# Patient Record
Sex: FEMALE | Employment: OTHER | URBAN - METROPOLITAN AREA
[De-identification: names, ages, dates, MRNs, and addresses within clinical notes are randomized per-mention and may not be internally consistent; named-entity substitution may affect disease eponyms.]

---

## 2017-01-25 ENCOUNTER — APPOINTMENT (OUTPATIENT)
Dept: CT IMAGING | Age: 32
End: 2017-01-25
Attending: PHYSICIAN ASSISTANT
Payer: COMMERCIAL

## 2017-01-25 ENCOUNTER — HOSPITAL ENCOUNTER (EMERGENCY)
Age: 32
Discharge: HOME | End: 2017-01-25
Attending: EMERGENCY MEDICINE
Payer: COMMERCIAL

## 2017-01-25 VITALS
WEIGHT: 180 LBS | RESPIRATION RATE: 16 BRPM | SYSTOLIC BLOOD PRESSURE: 125 MMHG | DIASTOLIC BLOOD PRESSURE: 65 MMHG | HEIGHT: 62 IN | BODY MASS INDEX: 33.13 KG/M2 | HEART RATE: 74 BPM | OXYGEN SATURATION: 100 %

## 2017-01-25 DIAGNOSIS — R10.84 ABDOMINAL PAIN, GENERALIZED: Primary | ICD-10-CM

## 2017-01-25 LAB
ALBUMIN SERPL BCP-MCNC: 4.2 G/DL (ref 3.5–5)
ALBUMIN/GLOB SERPL: 1.1 {RATIO} (ref 1.1–2.2)
ALP SERPL-CCNC: 64 U/L (ref 45–117)
ALT SERPL-CCNC: 24 U/L (ref 12–78)
ANION GAP BLD CALC-SCNC: 9 MMOL/L (ref 5–15)
APPEARANCE UR: CLEAR
AST SERPL W P-5'-P-CCNC: 13 U/L (ref 15–37)
ATRIAL RATE: 73 BPM
BACTERIA URNS QL MICRO: NEGATIVE /HPF
BASOPHILS # BLD AUTO: 0 K/UL (ref 0–0.1)
BASOPHILS # BLD: 0 % (ref 0–1)
BILIRUB SERPL-MCNC: 0.3 MG/DL (ref 0.2–1)
BILIRUB UR QL: NEGATIVE
BUN SERPL-MCNC: 13 MG/DL (ref 6–20)
BUN/CREAT SERPL: 16 (ref 12–20)
CALCIUM SERPL-MCNC: 8.6 MG/DL (ref 8.5–10.1)
CALCULATED P AXIS, ECG09: 7 DEGREES
CALCULATED R AXIS, ECG10: 8 DEGREES
CALCULATED T AXIS, ECG11: 4 DEGREES
CHLORIDE SERPL-SCNC: 103 MMOL/L (ref 97–108)
CO2 SERPL-SCNC: 25 MMOL/L (ref 21–32)
COLOR UR: ABNORMAL
CREAT SERPL-MCNC: 0.82 MG/DL (ref 0.55–1.02)
DIAGNOSIS, 93000: NORMAL
EOSINOPHIL # BLD: 0 K/UL (ref 0–0.4)
EOSINOPHIL NFR BLD: 0 % (ref 0–7)
EPITH CASTS URNS QL MICRO: ABNORMAL /LPF
ERYTHROCYTE [DISTWIDTH] IN BLOOD BY AUTOMATED COUNT: 12 % (ref 11.5–14.5)
GLOBULIN SER CALC-MCNC: 3.7 G/DL (ref 2–4)
GLUCOSE SERPL-MCNC: 115 MG/DL (ref 65–100)
GLUCOSE UR STRIP.AUTO-MCNC: NEGATIVE MG/DL
HCG UR QL: NEGATIVE
HCT VFR BLD AUTO: 38.4 % (ref 35–47)
HGB BLD-MCNC: 12.3 G/DL (ref 11.5–16)
HGB UR QL STRIP: ABNORMAL
HYALINE CASTS URNS QL MICRO: ABNORMAL /LPF (ref 0–5)
KETONES UR QL STRIP.AUTO: NEGATIVE MG/DL
LEUKOCYTE ESTERASE UR QL STRIP.AUTO: NEGATIVE
LIPASE SERPL-CCNC: 124 U/L (ref 73–393)
LYMPHOCYTES # BLD AUTO: 14 % (ref 12–49)
LYMPHOCYTES # BLD: 1.5 K/UL (ref 0.8–3.5)
MCH RBC QN AUTO: 28 PG (ref 26–34)
MCHC RBC AUTO-ENTMCNC: 32 G/DL (ref 30–36.5)
MCV RBC AUTO: 87.3 FL (ref 80–99)
MONOCYTES # BLD: 0.3 K/UL (ref 0–1)
MONOCYTES NFR BLD AUTO: 3 % (ref 5–13)
NEUTS SEG # BLD: 8.6 K/UL (ref 1.8–8)
NEUTS SEG NFR BLD AUTO: 83 % (ref 32–75)
NITRITE UR QL STRIP.AUTO: NEGATIVE
P-R INTERVAL, ECG05: 144 MS
PH UR STRIP: 6 [PH] (ref 5–8)
PLATELET # BLD AUTO: 273 K/UL (ref 150–400)
POTASSIUM SERPL-SCNC: 4.2 MMOL/L (ref 3.5–5.1)
PROT SERPL-MCNC: 7.9 G/DL (ref 6.4–8.2)
PROT UR STRIP-MCNC: NEGATIVE MG/DL
Q-T INTERVAL, ECG07: 434 MS
QRS DURATION, ECG06: 86 MS
QTC CALCULATION (BEZET), ECG08: 478 MS
RBC # BLD AUTO: 4.4 M/UL (ref 3.8–5.2)
RBC #/AREA URNS HPF: ABNORMAL /HPF (ref 0–5)
SODIUM SERPL-SCNC: 137 MMOL/L (ref 136–145)
SP GR UR REFRACTOMETRY: 1.03 (ref 1–1.03)
TROPONIN I SERPL-MCNC: <0.04 NG/ML
UA: UC IF INDICATED,UAUC: ABNORMAL
UROBILINOGEN UR QL STRIP.AUTO: 0.2 EU/DL (ref 0.2–1)
VENTRICULAR RATE, ECG03: 73 BPM
WBC # BLD AUTO: 10.4 K/UL (ref 3.6–11)
WBC URNS QL MICRO: ABNORMAL /HPF (ref 0–4)

## 2017-01-25 PROCEDURE — 81001 URINALYSIS AUTO W/SCOPE: CPT | Performed by: EMERGENCY MEDICINE

## 2017-01-25 PROCEDURE — 85025 COMPLETE CBC W/AUTO DIFF WBC: CPT | Performed by: PHYSICIAN ASSISTANT

## 2017-01-25 PROCEDURE — 80053 COMPREHEN METABOLIC PANEL: CPT | Performed by: PHYSICIAN ASSISTANT

## 2017-01-25 PROCEDURE — 96361 HYDRATE IV INFUSION ADD-ON: CPT

## 2017-01-25 PROCEDURE — 99285 EMERGENCY DEPT VISIT HI MDM: CPT

## 2017-01-25 PROCEDURE — 36415 COLL VENOUS BLD VENIPUNCTURE: CPT | Performed by: PHYSICIAN ASSISTANT

## 2017-01-25 PROCEDURE — 84484 ASSAY OF TROPONIN QUANT: CPT | Performed by: PHYSICIAN ASSISTANT

## 2017-01-25 PROCEDURE — 81025 URINE PREGNANCY TEST: CPT

## 2017-01-25 PROCEDURE — 83690 ASSAY OF LIPASE: CPT | Performed by: PHYSICIAN ASSISTANT

## 2017-01-25 PROCEDURE — 96374 THER/PROPH/DIAG INJ IV PUSH: CPT

## 2017-01-25 PROCEDURE — 74011636320 HC RX REV CODE- 636/320: Performed by: RADIOLOGY

## 2017-01-25 PROCEDURE — 74177 CT ABD & PELVIS W/CONTRAST: CPT

## 2017-01-25 PROCEDURE — 93005 ELECTROCARDIOGRAM TRACING: CPT

## 2017-01-25 PROCEDURE — 74011250636 HC RX REV CODE- 250/636: Performed by: PHYSICIAN ASSISTANT

## 2017-01-25 RX ORDER — ONDANSETRON 4 MG/1
4 TABLET, ORALLY DISINTEGRATING ORAL
Qty: 10 TAB | Refills: 0 | Status: SHIPPED | OUTPATIENT
Start: 2017-01-25 | End: 2018-06-12

## 2017-01-25 RX ORDER — KETOROLAC TROMETHAMINE 30 MG/ML
30 INJECTION, SOLUTION INTRAMUSCULAR; INTRAVENOUS
Status: COMPLETED | OUTPATIENT
Start: 2017-01-25 | End: 2017-01-25

## 2017-01-25 RX ORDER — FAMOTIDINE 20 MG/1
20 TABLET, FILM COATED ORAL 2 TIMES DAILY
Qty: 20 TAB | Refills: 0 | Status: SHIPPED | OUTPATIENT
Start: 2017-01-25 | End: 2017-02-04

## 2017-01-25 RX ADMIN — SODIUM CHLORIDE 1000 ML: 900 INJECTION, SOLUTION INTRAVENOUS at 08:43

## 2017-01-25 RX ADMIN — KETOROLAC TROMETHAMINE 30 MG: 30 INJECTION, SOLUTION INTRAMUSCULAR at 09:06

## 2017-01-25 RX ADMIN — IOPAMIDOL 95 ML: 755 INJECTION, SOLUTION INTRAVENOUS at 09:48

## 2017-01-25 NOTE — ED NOTES
Pt discharged by provider. Pt ambulatory off the unit with a steady gait with her spouse and in NAD. Pt declined using a w/c.

## 2017-01-25 NOTE — ED PROVIDER NOTES
HPI Comments: 27 y.o. female with past medical history significant for PCOS presents with complaints of abdominal pain, nausea, and diarrhea since 0330 this morning. The pt states that nothing makes the pain better or worse. The pt rates the pain as a 6/10 in severity. There is no radiation of the pain. The pt describes the pain as cramping sensation. The pt denies taking anything at home for the abdominal pain. There are no other acute medical complaints at this time. Denies fever, chills, HA, dizziness, light headedness, dyspnea, chest pain, melena, hematochezia, loss of bowel/bladder functioning, saddle anesthesia, urinary symptoms or any other acute medical conditions. PCP: CAMILA Reagan PA-C      Patient is a 27 y.o. female presenting with abdominal pain. Abdominal Pain    Associated symptoms include diarrhea and nausea. Pertinent negatives include no fever, no vomiting, no constipation, no dysuria, no hematuria, no arthralgias, no myalgias, no chest pain and no back pain. Past Medical History:   Diagnosis Date    Heart abnormality      heart murmur, pt states. does not see cardiologist for this.  Heart murmur     Polycystic disease, ovaries     Polycystic ovary syndrome        Past Surgical History:   Procedure Laterality Date    Hx carpal tunnel release           Family History:   Problem Relation Age of Onset    Hypertension Mother     Diabetes Mother     Stroke Father        Social History     Social History    Marital status:      Spouse name: N/A    Number of children: N/A    Years of education: N/A     Occupational History    Not on file.      Social History Main Topics    Smoking status: Never Smoker    Smokeless tobacco: Not on file    Alcohol use No    Drug use: No    Sexual activity: Yes     Partners: Male     Other Topics Concern    Not on file     Social History Narrative         ALLERGIES: Codeine and Percocet [oxycodone-acetaminophen]    Review of Systems   Constitutional: Negative for activity change, appetite change, diaphoresis and fever. HENT: Negative for ear discharge, ear pain, facial swelling, rhinorrhea, sore throat, tinnitus, trouble swallowing and voice change. Eyes: Negative for photophobia, pain, discharge, redness and visual disturbance. Respiratory: Negative for cough, chest tightness, shortness of breath, wheezing and stridor. Cardiovascular: Negative for chest pain and palpitations. Gastrointestinal: Positive for abdominal pain, diarrhea and nausea. Negative for constipation and vomiting. Endocrine: Negative for polydipsia and polyuria. Genitourinary: Negative for dysuria, flank pain and hematuria. Musculoskeletal: Negative for arthralgias, back pain and myalgias. Skin: Negative for color change and rash. Neurological: Negative for dizziness, syncope, speech difficulty, light-headedness and numbness. Psychiatric/Behavioral: Negative for behavioral problems. Vitals:    01/25/17 0812   BP: 134/72   Pulse: 67   Resp: 16   SpO2: 100%   Weight: 81.6 kg (180 lb)   Height: 5' 2\" (1.575 m)            Physical Exam   Constitutional: She is oriented to person, place, and time. She appears well-developed and well-nourished. HENT:   Head: Normocephalic and atraumatic. Eyes: Conjunctivae are normal. Pupils are equal, round, and reactive to light. Right eye exhibits no discharge. Left eye exhibits no discharge. Neck: Normal range of motion. Neck supple. No thyromegaly present. Cardiovascular: Normal rate, regular rhythm and normal heart sounds. Exam reveals no gallop and no friction rub. No murmur heard. Pulmonary/Chest: Effort normal and breath sounds normal. No respiratory distress. She has no wheezes. Abdominal: Soft. Bowel sounds are normal. She exhibits no distension. There is no tenderness. There is no rebound and no guarding.    No abdominal bruits or pulsatile masses. No abdominal hernias. Negative Lous sign. Pt abdomen non tender to both light and deep palpation. No mindy-umbilical or flank ecchymosis. Musculoskeletal: Normal range of motion. Neurological: She is alert and oriented to person, place, and time. Skin: Skin is warm. Psychiatric: She has a normal mood and affect. MDM  Number of Diagnoses or Management Options  Abdominal pain, generalized:   Diagnosis management comments: Pt presents with abdominal pain nausea and diarrhea. Labs and imaging studies all unremarkable. Sx likely result of GI virus. Will rx pepcid and zofran and advise follow up with family doctor. Reviewed treatment plan with attending and they agree. Melquiades Solomon      ED Course       Procedures      ED EKG interpretation:  Rhythm: normal sinus rhythm; and regular . Rate (approx.): 73; Axis: normal; P wave: normal; QRS interval: normal ; ST/T wave: normal; This EKG was interpreted by Gumaro Johnson PA-C,ED Provider.

## 2017-01-25 NOTE — DISCHARGE INSTRUCTIONS
Abdominal Pain: Care Instructions  Your Care Instructions    Abdominal pain has many possible causes. Some aren't serious and get better on their own in a few days. Others need more testing and treatment. If your pain continues or gets worse, you need to be rechecked and may need more tests to find out what is wrong. You may need surgery to correct the problem. Don't ignore new symptoms, such as fever, nausea and vomiting, urination problems, pain that gets worse, and dizziness. These may be signs of a more serious problem. Your doctor may have recommended a follow-up visit in the next 8 to 12 hours. If you are not getting better, you may need more tests or treatment. The doctor has checked you carefully, but problems can develop later. If you notice any problems or new symptoms, get medical treatment right away. Follow-up care is a key part of your treatment and safety. Be sure to make and go to all appointments, and call your doctor if you are having problems. It's also a good idea to know your test results and keep a list of the medicines you take. How can you care for yourself at home? · Rest until you feel better. · To prevent dehydration, drink plenty of fluids, enough so that your urine is light yellow or clear like water. Choose water and other caffeine-free clear liquids until you feel better. If you have kidney, heart, or liver disease and have to limit fluids, talk with your doctor before you increase the amount of fluids you drink. · If your stomach is upset, eat mild foods, such as rice, dry toast or crackers, bananas, and applesauce. Try eating several small meals instead of two or three large ones. · Wait until 48 hours after all symptoms have gone away before you have spicy foods, alcohol, and drinks that contain caffeine. · Do not eat foods that are high in fat. · Avoid anti-inflammatory medicines such as aspirin, ibuprofen (Advil, Motrin), and naproxen (Aleve).  These can cause stomach upset. Talk to your doctor if you take daily aspirin for another health problem. When should you call for help? Call 911 anytime you think you may need emergency care. For example, call if:  · You passed out (lost consciousness). · You pass maroon or very bloody stools. · You vomit blood or what looks like coffee grounds. · You have new, severe belly pain. Call your doctor now or seek immediate medical care if:  · Your pain gets worse, especially if it becomes focused in one area of your belly. · You have a new or higher fever. · Your stools are black and look like tar, or they have streaks of blood. · You have unexpected vaginal bleeding. · You have symptoms of a urinary tract infection. These may include:  ¨ Pain when you urinate. ¨ Urinating more often than usual.  ¨ Blood in your urine. · You are dizzy or lightheaded, or you feel like you may faint. Watch closely for changes in your health, and be sure to contact your doctor if:  · You are not getting better after 1 day (24 hours). Where can you learn more? Go to http://gustavo-chema.info/. Enter O339 in the search box to learn more about \"Abdominal Pain: Care Instructions. \"  Current as of: May 27, 2016  Content Version: 11.1  © 1457-6763 [email or web address]. Care instructions adapted under license by [employer] (which disclaims liability or warranty for this information). If you have questions about a medical condition or this instruction, always ask your healthcare professional. [place] [address] any warranty or liability for your use of this information. We hope that we have addressed all of your medical concerns. The examination and treatment you received in the Emergency Department were for an emergent problem and were not intended as complete care. It is important that you follow up with your healthcare provider(s) for ongoing care.  If your symptoms worsen or do not improve as expected, and you are unable to reach your usual health care provider(s), you should return to the Emergency Department. Today's healthcare is undergoing tremendous change, and patient satisfaction surveys are one of the many tools to assess the quality of medical care. You may receive a survey from the [employer] regarding your experience in the Emergency Department. I hope that your experience has been completely positive, particularly the medical care that I provided. As such, please participate in the survey; anything less than excellent does not meet my expectations or intentions. [address] [place] [address] and [employer] participate in nationally recognized quality of care measures. If your blood pressure is greater than 120/80, as reported below, we urge that you seek medical care to address the potential of high blood pressure, commonly known as hypertension. Hypertension can be hereditary or can be caused by certain medical conditions, pain, stress, or \"white coat syndrome. \"       Please make an appointment with your health care provider(s) for follow up of your Emergency Department visit. VITALS:   Patient Vitals for the past 8 hrs:   Temp Pulse Resp BP SpO2   01/25/17 1030 - - - 130/78 100 %   01/25/17 0955 - - - 128/64 100 %   01/25/17 0932 - - - 123/67 100 %   01/25/17 0911 - - - 130/70 100 %   01/25/17 0812 - 67 16 134/72 100 %          Thank you for allowing us to provide you with medical care today. We realize that you have many choices for your emergency care needs. Please choose us in the future for any continued health care needs. Shyam Acevedo, [facility].   Office: [phone]            Recent Results (from the past 24 hour(s))   EKG, 12 LEAD, INITIAL    Collection Time: 01/25/17  8:30 AM   Result Value Ref Range    Ventricular Rate 73 BPM    Atrial Rate 73 BPM P-R Interval 144 ms    QRS Duration 86 ms    Q-T Interval 434 ms    QTC Calculation (Bezet) 478 ms    Calculated P Axis 7 degrees    Calculated R Axis 8 degrees    Calculated T Axis 4 degrees    Diagnosis       Normal sinus rhythm  Normal ECG  No previous ECGs available  Confirmed by Tarik Matthew MD ([id number]) on 1/25/2017 10:24:35 AM     CBC WITH AUTOMATED DIFF    Collection Time: 01/25/17  8:42 AM   Result Value Ref Range    WBC 10.4 3.6 - 11.0 K/uL    RBC 4.40 3.80 - 5.20 M/uL    HGB 12.3 11.5 - 16.0 g/dL    HCT 38.4 35.0 - 47.0 %    MCV 87.3 80.0 - 99.0 FL    MCH 28.0 26.0 - 34.0 PG    MCHC 32.0 30.0 - 36.5 g/dL    RDW 12.0 11.5 - 14.5 %    PLATELET 657 017 - 394 K/uL    NEUTROPHILS 83 (H) 32 - 75 %    LYMPHOCYTES 14 12 - 49 %    MONOCYTES 3 (L) 5 - 13 %    EOSINOPHILS 0 0 - 7 %    BASOPHILS 0 0 - 1 %    ABS. NEUTROPHILS 8.6 (H) 1.8 - 8.0 K/UL    ABS. LYMPHOCYTES 1.5 0.8 - 3.5 K/UL    ABS. MONOCYTES 0.3 0.0 - 1.0 K/UL    ABS. EOSINOPHILS 0.0 0.0 - 0.4 K/UL    ABS. BASOPHILS 0.0 0.0 - 0.1 K/UL   METABOLIC PANEL, COMPREHENSIVE    Collection Time: 01/25/17  8:42 AM   Result Value Ref Range    Sodium 137 136 - 145 mmol/L    Potassium 4.2 3.5 - 5.1 mmol/L    Chloride 103 97 - 108 mmol/L    CO2 25 21 - 32 mmol/L    Anion gap 9 5 - 15 mmol/L    Glucose 115 (H) 65 - 100 mg/dL    BUN 13 6 - 20 MG/DL    Creatinine 0.82 0.55 - 1.02 MG/DL    BUN/Creatinine ratio 16 12 - 20      GFR est AA >60 >60 ml/min/1.73m2    GFR est non-AA >60 >60 ml/min/1.73m2    Calcium 8.6 8.5 - 10.1 MG/DL    Bilirubin, total 0.3 0.2 - 1.0 MG/DL    ALT 24 12 - 78 U/L    AST 13 (L) 15 - 37 U/L    Alk.  phosphatase 64 45 - 117 U/L    Protein, total 7.9 6.4 - 8.2 g/dL    Albumin 4.2 3.5 - 5.0 g/dL    Globulin 3.7 2.0 - 4.0 g/dL    A-G Ratio 1.1 1.1 - 2.2     LIPASE    Collection Time: 01/25/17  8:42 AM   Result Value Ref Range    Lipase 124 73 - 393 U/L   TROPONIN I    Collection Time: 01/25/17  8:42 AM   Result Value Ref Range    Troponin-I, Qt. <0.04 <0.05 ng/mL   HCG URINE, QL. - POC    Collection Time: 01/25/17  9:05 AM   Result Value Ref Range    Pregnancy test,urine (POC) NEGATIVE  NEG     URINALYSIS W/ REFLEX CULTURE    Collection Time: 01/25/17  9:08 AM   Result Value Ref Range    Color YELLOW/STRAW      Appearance CLEAR CLEAR      Specific gravity 1.027 1.003 - 1.030      pH (UA) 6.0 5.0 - 8.0      Protein NEGATIVE  NEG mg/dL    Glucose NEGATIVE  NEG mg/dL    Ketone NEGATIVE  NEG mg/dL    Bilirubin NEGATIVE  NEG      Blood LARGE (A) NEG      Urobilinogen 0.2 0.2 - 1.0 EU/dL    Nitrites NEGATIVE  NEG      Leukocyte Esterase NEGATIVE  NEG      WBC 0-4 0 - 4 /hpf    RBC  0 - 5 /hpf    Epithelial cells FEW FEW /lpf    Bacteria NEGATIVE  NEG /hpf    UA:UC IF INDICATED CULTURE NOT INDICATED BY UA RESULT CNI      Hyaline Cast 0-2 0 - 5 /lpf       Ct Abd Pelv W Cont    Result Date: 1/25/2017  INDICATION: Acute mid upper abdominal pain, nausea, diarrhea COMPARISON: None TECHNIQUE: Following the uneventful intravenous administration of 100 cc Isovue-370, thin axial images were obtained through the abdomen and pelvis. Coronal and sagittal reconstructions were generated. Oral contrast was not administered. CT dose reduction was achieved through use of a standardized protocol tailored for this examination and automatic exposure control for dose modulation. FINDINGS: LUNG BASES: Clear. INCIDENTALLY IMAGED HEART AND MEDIASTINUM: Unremarkable. LIVER: No mass or biliary dilatation. GALLBLADDER: Unremarkable. SPLEEN: No mass. PANCREAS: No mass or ductal dilatation. ADRENALS: Unremarkable. KIDNEYS: Bilateral nonobstructing renal stones are noted, the largest of which measures 5 mm. There is no ureteral stone or hydronephrosis. STOMACH: Unremarkable. SMALL BOWEL: No dilatation or wall thickening. COLON: No dilatation or wall thickening. APPENDIX: Unremarkable. PERITONEUM: No ascites or pneumoperitoneum. RETROPERITONEUM: No lymphadenopathy or aortic aneurysm. REPRODUCTIVE ORGANS: Intrauterine device projects in satisfactory position. There is no pelvic mass or lymphadenopathy. URINARY BLADDER: No mass or calculus. BONES: No destructive bone lesion. ADDITIONAL COMMENTS: N/A     IMPRESSION: Bilateral nonobstructing renal stones. No acute abnormality.

## 2018-06-11 ENCOUNTER — APPOINTMENT (OUTPATIENT)
Dept: GENERAL RADIOLOGY | Age: 33
End: 2018-06-11
Attending: EMERGENCY MEDICINE
Payer: COMMERCIAL

## 2018-06-11 ENCOUNTER — APPOINTMENT (OUTPATIENT)
Dept: GENERAL RADIOLOGY | Age: 33
End: 2018-06-11
Attending: PHYSICIAN ASSISTANT
Payer: COMMERCIAL

## 2018-06-11 ENCOUNTER — APPOINTMENT (OUTPATIENT)
Dept: CT IMAGING | Age: 33
End: 2018-06-11
Attending: PHYSICIAN ASSISTANT
Payer: COMMERCIAL

## 2018-06-11 ENCOUNTER — HOSPITAL ENCOUNTER (EMERGENCY)
Age: 33
Discharge: HOME | End: 2018-06-12
Attending: EMERGENCY MEDICINE | Admitting: EMERGENCY MEDICINE
Payer: COMMERCIAL

## 2018-06-11 ENCOUNTER — APPOINTMENT (OUTPATIENT)
Dept: GENERAL RADIOLOGY | Age: 33
End: 2018-06-11
Attending: ORTHOPAEDIC SURGERY
Payer: COMMERCIAL

## 2018-06-11 VITALS
TEMPERATURE: 98.2 F | WEIGHT: 182 LBS | HEIGHT: 63 IN | DIASTOLIC BLOOD PRESSURE: 65 MMHG | RESPIRATION RATE: 18 BRPM | BODY MASS INDEX: 32.25 KG/M2 | HEART RATE: 67 BPM | OXYGEN SATURATION: 94 % | SYSTOLIC BLOOD PRESSURE: 122 MMHG

## 2018-06-11 DIAGNOSIS — S82.401A CLOSED FRACTURE OF RIGHT TIBIA AND FIBULA, INITIAL ENCOUNTER: Primary | ICD-10-CM

## 2018-06-11 DIAGNOSIS — S82.201A CLOSED FRACTURE OF RIGHT TIBIA AND FIBULA, INITIAL ENCOUNTER: Primary | ICD-10-CM

## 2018-06-11 PROCEDURE — 74011250636 HC RX REV CODE- 250/636: Performed by: EMERGENCY MEDICINE

## 2018-06-11 PROCEDURE — 99284 EMERGENCY DEPT VISIT MOD MDM: CPT

## 2018-06-11 PROCEDURE — 73610 X-RAY EXAM OF ANKLE: CPT

## 2018-06-11 PROCEDURE — 74011250636 HC RX REV CODE- 250/636: Performed by: PHYSICIAN ASSISTANT

## 2018-06-11 PROCEDURE — 73700 CT LOWER EXTREMITY W/O DYE: CPT

## 2018-06-11 PROCEDURE — 96375 TX/PRO/DX INJ NEW DRUG ADDON: CPT

## 2018-06-11 PROCEDURE — 74011000250 HC RX REV CODE- 250: Performed by: EMERGENCY MEDICINE

## 2018-06-11 PROCEDURE — 96376 TX/PRO/DX INJ SAME DRUG ADON: CPT

## 2018-06-11 PROCEDURE — 75810000303 HC CLSD TRMT  FRACTURE/DISLOCATION W/  ANES

## 2018-06-11 PROCEDURE — 74011250637 HC RX REV CODE- 250/637: Performed by: EMERGENCY MEDICINE

## 2018-06-11 PROCEDURE — 73600 X-RAY EXAM OF ANKLE: CPT

## 2018-06-11 PROCEDURE — 96374 THER/PROPH/DIAG INJ IV PUSH: CPT

## 2018-06-11 RX ORDER — HYDROMORPHONE HYDROCHLORIDE 1 MG/ML
0.5 INJECTION, SOLUTION INTRAMUSCULAR; INTRAVENOUS; SUBCUTANEOUS
Status: COMPLETED | OUTPATIENT
Start: 2018-06-11 | End: 2018-06-12

## 2018-06-11 RX ORDER — MORPHINE SULFATE 4 MG/ML
4 INJECTION INTRAVENOUS
Status: COMPLETED | OUTPATIENT
Start: 2018-06-11 | End: 2018-06-11

## 2018-06-11 RX ORDER — HYDROMORPHONE HYDROCHLORIDE 2 MG/1
2 TABLET ORAL
Qty: 10 TAB | Refills: 0 | Status: SHIPPED | OUTPATIENT
Start: 2018-06-11 | End: 2020-09-22

## 2018-06-11 RX ORDER — HYDROCODONE BITARTRATE AND ACETAMINOPHEN 7.5; 325 MG/1; MG/1
1 TABLET ORAL
Status: COMPLETED | OUTPATIENT
Start: 2018-06-11 | End: 2018-06-11

## 2018-06-11 RX ORDER — HYDROCODONE BITARTRATE AND ACETAMINOPHEN 7.5; 325 MG/1; MG/1
1 TABLET ORAL
Status: DISCONTINUED | OUTPATIENT
Start: 2018-06-11 | End: 2018-06-11

## 2018-06-11 RX ORDER — HYDROMORPHONE HYDROCHLORIDE 1 MG/ML
1 INJECTION, SOLUTION INTRAMUSCULAR; INTRAVENOUS; SUBCUTANEOUS
Status: COMPLETED | OUTPATIENT
Start: 2018-06-11 | End: 2018-06-11

## 2018-06-11 RX ORDER — BUPIVACAINE HYDROCHLORIDE 5 MG/ML
5 INJECTION, SOLUTION EPIDURAL; INTRACAUDAL
Status: COMPLETED | OUTPATIENT
Start: 2018-06-11 | End: 2018-06-11

## 2018-06-11 RX ORDER — LIDOCAINE HYDROCHLORIDE 20 MG/ML
5 INJECTION, SOLUTION EPIDURAL; INFILTRATION; INTRACAUDAL; PERINEURAL ONCE
Status: COMPLETED | OUTPATIENT
Start: 2018-06-11 | End: 2018-06-11

## 2018-06-11 RX ORDER — HYDROMORPHONE HYDROCHLORIDE 1 MG/ML
0.5 INJECTION, SOLUTION INTRAMUSCULAR; INTRAVENOUS; SUBCUTANEOUS
Status: COMPLETED | OUTPATIENT
Start: 2018-06-11 | End: 2018-06-11

## 2018-06-11 RX ORDER — ONDANSETRON 2 MG/ML
4 INJECTION INTRAMUSCULAR; INTRAVENOUS
Status: COMPLETED | OUTPATIENT
Start: 2018-06-11 | End: 2018-06-11

## 2018-06-11 RX ADMIN — LIDOCAINE HYDROCHLORIDE 100 MG: 20 INJECTION, SOLUTION EPIDURAL; INFILTRATION; INTRACAUDAL; PERINEURAL at 21:10

## 2018-06-11 RX ADMIN — BUPIVACAINE HYDROCHLORIDE 25 MG: 5 INJECTION, SOLUTION EPIDURAL; INTRACAUDAL at 21:10

## 2018-06-11 RX ADMIN — HYDROMORPHONE HYDROCHLORIDE 0.5 MG: 1 INJECTION, SOLUTION INTRAMUSCULAR; INTRAVENOUS; SUBCUTANEOUS at 21:53

## 2018-06-11 RX ADMIN — MORPHINE SULFATE 4 MG: 4 INJECTION INTRAVENOUS at 19:04

## 2018-06-11 RX ADMIN — HYDROCODONE BITARTRATE AND ACETAMINOPHEN 1 TABLET: 7.5; 325 TABLET ORAL at 23:03

## 2018-06-11 RX ADMIN — ONDANSETRON HYDROCHLORIDE 4 MG: 2 INJECTION INTRAMUSCULAR; INTRAVENOUS at 19:03

## 2018-06-11 RX ADMIN — HYDROMORPHONE HYDROCHLORIDE 1 MG: 1 INJECTION, SOLUTION INTRAMUSCULAR; INTRAVENOUS; SUBCUTANEOUS at 19:50

## 2018-06-11 RX ADMIN — HYDROMORPHONE HYDROCHLORIDE 1 MG: 1 INJECTION, SOLUTION INTRAMUSCULAR; INTRAVENOUS; SUBCUTANEOUS at 23:03

## 2018-06-11 NOTE — ED NOTES
Bedside and Verbal shift change report given to Valerie Allen RN (oncoming nurse) by Kalin Do RN (offgoing nurse). Report included the following information SBAR, ED Summary and MAR.

## 2018-06-11 NOTE — ED TRIAGE NOTES
Patient with right ankle deformity, slipped on a wet tile floor, denies any head injury or LOC, arrives EMS, fentanyl 100mcg en route brings pain from a 20 to a 7/10.   +PMS upon arrival.  Ice applied and elevated on pillows

## 2018-06-11 NOTE — ED PROVIDER NOTES
HPI Comments: Laine Geller is a 34 y.o. female  who presents by EMS to ER with c/o Patient presents with: Ankle Injury. Patient presents with right ankle pain after slipping on wet tile in the kitchen. Patient denies head injury or LOC. PATient was given fentanyl by EMS on route. She specifically denies any fevers, chills, nausea, vomiting, chest pain, shortness of breath, headache, rash, diarrhea, abdominal pain, urinary/bowel changes, sweating or weight loss. PCP: Erlin Felipe NP   PMHx significant for: Past Medical History:  No date: Heart abnormality      Comment: heart murmur, pt states. does not see                cardiologist for this. No date: Heart murmur  No date: Polycystic disease, ovaries  No date: Polycystic ovary syndrome   PSHx significant for: Past Surgical History:  No date: HX CARPAL TUNNEL RELEASE  Social Hx: Tobacco use: Smoking status: Never Smoker                                                                 Smokeless status: Not on file                     ; EtOH use: The patient states she drinks 0 per week.; Illicit Drug use: Allergies:   -- Codeine -- Hives   -- Percocet (Oxycodone-Acetaminophen) -- Hives    There are no other complaints, changes or physical findings at this time. Patient is a 34 y.o. female presenting with ankle problem. The history is provided by the patient. Ankle Injury    This is a new problem. The current episode started less than 1 hour ago. The problem occurs constantly. The problem has not changed since onset. The pain is present in the right ankle. The quality of the pain is described as aching. The pain is at a severity of 7/10. The pain is severe. Pertinent negatives include no numbness, full range of motion, no stiffness, no tingling, no itching, no back pain and no neck pain. The symptoms are aggravated by movement and palpation. She has tried nothing for the symptoms. There has been a history of trauma.         Past Medical History:   Diagnosis Date    Heart abnormality     heart murmur, pt states. does not see cardiologist for this.  Heart murmur     Polycystic disease, ovaries     Polycystic ovary syndrome        Past Surgical History:   Procedure Laterality Date    HX CARPAL TUNNEL RELEASE           Family History:   Problem Relation Age of Onset    Hypertension Mother     Diabetes Mother     Stroke Father        Social History     Social History    Marital status:      Spouse name: N/A    Number of children: N/A    Years of education: N/A     Occupational History    Not on file. Social History Main Topics    Smoking status: Never Smoker    Smokeless tobacco: Not on file    Alcohol use No    Drug use: No    Sexual activity: Yes     Partners: Male     Other Topics Concern    Not on file     Social History Narrative         ALLERGIES: Codeine and Percocet [oxycodone-acetaminophen]    Review of Systems   Constitutional: Negative. HENT: Negative. Eyes: Negative. Respiratory: Negative. Cardiovascular: Negative. Gastrointestinal: Negative. Endocrine: Negative. Genitourinary: Negative. Musculoskeletal: Positive for arthralgias. Negative for back pain, neck pain and stiffness. Skin: Negative. Negative for itching. Allergic/Immunologic: Negative. Neurological: Negative. Negative for tingling and numbness. Hematological: Negative. Psychiatric/Behavioral: Negative. All other systems reviewed and are negative. Vitals:    06/11/18 1857 06/11/18 1900   BP: 126/69 117/77   Pulse: 67    Resp: 18    Temp: 98.2 °F (36.8 °C)    SpO2: 99% 96%   Weight: 82.6 kg (182 lb)    Height: 5' 3\" (1.6 m)             Physical Exam   Constitutional: She is oriented to person, place, and time. She appears well-developed. HENT:   Head: Normocephalic and atraumatic.    Right Ear: External ear normal.   Left Ear: External ear normal.   Nose: Nose normal.   Mouth/Throat: Oropharynx is clear and moist. No oropharyngeal exudate. Eyes: Conjunctivae, EOM and lids are normal. Right eye exhibits no discharge. Left eye exhibits no discharge. Neck: Normal range of motion. No tracheal deviation present. No thyromegaly present. Cardiovascular: Normal rate, regular rhythm, normal heart sounds and intact distal pulses. Pulmonary/Chest: Effort normal and breath sounds normal.   Abdominal: Soft. Normal appearance and bowel sounds are normal.   Musculoskeletal: Normal range of motion. Right ankle: She exhibits swelling and deformity. Tenderness. Achilles tendon normal.   Right ankle with TTP, and deformity. RLE is NVI. Pulses 2+ at dorsalis pedis, capillary refill <3 seconds, sensation intact. PAtient is able to dorsiflex and plantar flex toes. Neurological: She is alert and oriented to person, place, and time. Skin: Skin is warm and dry. Psychiatric: She has a normal mood and affect. Judgment normal.        MDM  Number of Diagnoses or Management Options  Closed fracture of right tibia and fibula, initial encounter:   Diagnosis management comments: Assesment/Plan- 34 y.o. Patient presents with: Ankle Injury  differential includes: ankle fracture, sprain. imaging reviewed with distal tibia and fibula fracture. Patient seen and reduced by ortho. Placed in long leg splint. Discharged home with crutches. Instructions on compartment syndrome dicussed. Recommend Ortho follow up. Patient educated on reasons to return to the ED.          Amount and/or Complexity of Data Reviewed  Tests in the radiology section of CPT®: ordered and reviewed  Discuss the patient with other providers: yes (attending- Dr. Jaison Quarles who also saw patient and agrees with plan)          ED Course       Reduction of Joint  Date/Time: 6/11/2018 9:51 PM  Performed by: Renetta Redding by: Mee Yusuf     Consent:     Consent obtained:  Verbal and written    Consent given by:  Patient    Risks discussed:  Pain Alternatives discussed:  No treatment  Injury:     Injury location:  Lower leg    Lower leg injury location:  R lower leg    Lower leg fracture type: tibial and fibular shafts    Pre-procedure assessment:     Neurological function: normal      Distal perfusion: normal      Range of motion: normal    Anesthesia (see MAR for exact dosages): Anesthesia method:  None  Procedure details:     Manipulation performed: yes      Skin traction used: no      Skeletal traction used: no      Pin inserted: no      Reduction successful: no      X-ray confirmed reduction: yes      Immobilization:  Crutches and splint    Splint type:  Short leg and ankle stirrup    Supplies used:  Ortho-Glass, cotton padding and elastic bandage  Post-procedure assessment:     Neurological function: normal      Distal perfusion: normal      Range of motion: normal      Patient tolerance of procedure: Tolerated well, no immediate complications  Comments:      Post-reduction films reviewed. Distal fragment to tibia with worsening posterior displacement, reviewed with orthopedics who will be in for further evaluation                   CONSULT NOTE:   7:44 PM  Torsten Galvan PA-C spoke with Dr. Suzi Ram and All Valadez PA-C,   Specialty: ORtho  Discussed pt's hx, disposition, and available diagnostic and imaging results. Reviewed care plans. Consultant agrees with plans as outlined. Recommended reduction, splinting and follow up with Dr. Geraldine Ang.

## 2018-06-12 PROCEDURE — 74011250637 HC RX REV CODE- 250/637

## 2018-06-12 PROCEDURE — 75810000303 HC CLSD TRMT  FRACTURE/DISLOCATION W/  ANES

## 2018-06-12 RX ORDER — ONDANSETRON 4 MG/1
TABLET, ORALLY DISINTEGRATING ORAL
Status: COMPLETED
Start: 2018-06-12 | End: 2018-06-12

## 2018-06-12 RX ORDER — ONDANSETRON 4 MG/1
4 TABLET, ORALLY DISINTEGRATING ORAL
Qty: 12 TAB | Refills: 0 | Status: SHIPPED | OUTPATIENT
Start: 2018-06-12 | End: 2020-09-22

## 2018-06-12 RX ORDER — ONDANSETRON 4 MG/1
4 TABLET, ORALLY DISINTEGRATING ORAL
Status: COMPLETED | OUTPATIENT
Start: 2018-06-12 | End: 2018-06-12

## 2018-06-12 RX ADMIN — ONDANSETRON 4 MG: 4 TABLET, ORALLY DISINTEGRATING ORAL at 00:17

## 2018-06-12 RX ADMIN — HYDROMORPHONE HYDROCHLORIDE 0.5 MG: 1 INJECTION, SOLUTION INTRAMUSCULAR; INTRAVENOUS; SUBCUTANEOUS at 00:02

## 2018-06-12 NOTE — DISCHARGE INSTRUCTIONS
Broken Lower Leg: Care Instructions  Your Care Instructions    Treatment for your broken leg will depend on how bad the break is. Your doctor may have put your lower leg in a splint or a cast to allow it to heal or keep it stable until you see another doctor. It may take weeks or months for your leg to heal. You can help it heal with some care at home. You heal best when you take good care of yourself. Eat a variety of healthy foods, and don't smoke. Follow-up care is a key part of your treatment and safety. Be sure to make and go to all appointments, and call your doctor if you are having problems. It's also a good idea to know your test results and keep a list of the medicines you take. How can you care for yourself at home? · Put ice or a cold pack on your lower leg for 10 to 20 minutes at a time. Try to do this every 1 to 2 hours for the next 3 days (when you are awake). Put a thin cloth between the ice and your cast or splint. Keep your cast or splint dry. · Follow the cast care instructions your doctor gives you. If you have a splint, do not take it off unless your doctor tells you to. · Be safe with medicines. Take pain medicines exactly as directed. ¨ If the doctor gave you a prescription medicine for pain, take it as prescribed. ¨ If you are not taking a prescription pain medicine, ask your doctor if you can take an over-the-counter medicine. · Do not put weight on your leg unless your doctor tells you to. Use crutches to walk. · Prop up your leg on pillows when you sit or lie down in the first few days after the injury. Keep your leg higher than the level of your heart. This will help reduce swelling. · Follow instructions for exercises to keep your leg strong. · Wiggle your toes often to reduce swelling and stiffness. When should you call for help? Call 911 anytime you think you may need emergency care.  For example, call if:  ? · You have chest pain, are short of breath, or you cough up blood.   ? · You are very sleepy and you have trouble waking up. ?Call your doctor now or seek immediate medical care if:  ? · You have new or worse nausea or vomiting. ? · You have new or worse pain. ? · Your foot is cool or pale or changes color. ? · You have tingling, weakness, or numbness in your toes. ? · Your cast or splint feels too tight. ? · You have signs of a blood clot in your leg (called a deep vein thrombosis), such as:  ¨ Pain in your calf, back of the knee, thigh, or groin. ¨ Redness or swelling in your leg. ? Watch closely for changes in your health, and be sure to contact your doctor if:  ? · You have a problem with your splint or cast.   ? · You do not get better as expected. Where can you learn more? Go to http://gustavo-chema.info/. Enter L198 in the search box to learn more about \"Broken Lower Leg: Care Instructions. \"  Current as of: March 21, 2017  Content Version: 11.4  © 1001-1430 [employer]. Care instructions adapted under license by [employer] (which disclaims liability or warranty for this information). If you have questions about a medical condition or this instruction, always ask your healthcare professional. [address] any warranty or liability for your use of this information. We hope that we have addressed all of your medical concerns. The examination and treatment you received in the Emergency Department were for an emergent problem and were not intended as complete care. It is important that you follow up with your healthcare provider(s) for ongoing care. If your symptoms worsen or do not improve as expected, and you are unable to reach your usual health care provider(s), you should return to the Emergency Department. Today's healthcare is undergoing tremendous change, and patient satisfaction surveys are one of the many tools to assess the quality of medical care.   You may receive a survey from the [employer] regarding your experience in the Emergency Department. I hope that your experience has been completely positive, particularly the medical care that I provided. As such, please participate in the survey; anything less than excellent does not meet my expectations or intentions. [facility] and [facility] participate in nationally recognized quality of care measures. If your blood pressure is greater than 120/80, as reported below, we urge that you seek medical care to address the potential of high blood pressure, commonly known as hypertension. Hypertension can be hereditary or can be caused by certain medical conditions, pain, stress, or \"white coat syndrome. \"       Please make an appointment with your health care provider(s) for follow up of your Emergency Department visit. VITALS:   Patient Vitals for the past 8 hrs:   Temp Pulse Resp BP SpO2   06/11/18 2130 - - - 122/65 94 %   06/11/18 1900 - - - 117/77 96 %   06/11/18 1857 98.2 °F (36.8 °C) 67 18 126/69 99 %          Thank you for allowing us to provide you with medical care today. We realize that you have many choices for your emergency care needs. Please choose us in the future for any continued health care needs. Samm Larios, [facility]: [phone]            No results found for this or any previous visit (from the past 24 hour(s)). Xr Ankle Rt Ap/lat    Result Date: 6/11/2018  INDICATION: Closed reduction of distal tibia and fibular fractures COMPARISON: June 11, 2018 at 9:15 PM FINDINGS: 2 views of the right ankle obtained at 10:52 PM in cast material again demonstrate comminuted fractures of the distal fibular shaft and distal tibia. Both fractures remain displaced, although there is slight improvement in alignment of the distal tibial fracture.      IMPRESSION: Comminuted, displaced distal tibia and fibula fractures, with slight improvement in alignment of the tibial fracture following closed reduction. Xr Ankle Rt Ap/lat    Result Date: 6/11/2018  EXAM:  XR ANKLE RT AP/LAT INDICATION:  post reduction. COMPARISON: 6/11/2018. FINDINGS: Two views of the right ankle. The patient is in a cast. There is an unchanged spiral fracture of the distal femoral metadiaphysis. There is mild lateral displacement of the distal tibia by 5 mm. Again seen is a comminuted fracture of the distal fibular shaft. This is more displaced posteriorly in the interval, 11 mm at its greatest. Again seen is a fracture of the posterior malleolus with less impaction than previously seen. IMPRESSION: 1. Increased posterior displacement of the distal fibular fracture. 2. Unchanged spiral fracture of the distal tibial metadiaphysis. 3. Less impaction of the posterior malleolar fracture. .     Xr Ankle Rt Min 3 V    Result Date: 6/11/2018  EXAM:  XR ANKLE RT MIN 3 V INDICATION:  ankle pain. COMPARISON: None. FINDINGS: Three views of the right ankle. There is a spiral fracture of the distal tibial metadiaphysis. There is lateral displacement of the distal tibia approximately 5 mm. There is an intra-articular fracture of the posterior malleolus of the distal tibia with impaction by 2 mm. There is a mildly comminuted fracture of the distal fibular shaft with mild apex medial angulation as well. The ankle mortise is intact. A tibiotalar joint effusion is not present. The talar dome is intact. There is a small plantar calcaneal enthesophyte. There is soft tissue swelling surrounding the ankle, greater laterally. IMPRESSION: Nondisplaced fractures of the distal tibia and fibula.

## 2018-06-12 NOTE — ED NOTES
Patient discharged by [facility]. Patient and friend given the opportunity to ask questions, verbalized understanding of teaching.  Patient provided with crutch teaching

## 2020-03-13 ENCOUNTER — TELEPHONE (OUTPATIENT)
Dept: NEUROSURGERY | Age: 35
End: 2020-03-13

## 2020-03-16 ENCOUNTER — TELEPHONE (OUTPATIENT)
Dept: NEUROSURGERY | Age: 35
End: 2020-03-16

## 2020-03-16 NOTE — TELEPHONE ENCOUNTER
I was able to connect with the patient about the temporary office closure. Patient very understanding.      PEPITO

## 2020-04-24 ENCOUNTER — VIRTUAL VISIT (OUTPATIENT)
Dept: NEUROSURGERY | Age: 35
End: 2020-04-24

## 2020-04-24 VITALS — WEIGHT: 180 LBS | HEIGHT: 63 IN | BODY MASS INDEX: 31.89 KG/M2

## 2020-04-24 DIAGNOSIS — E66.9 OBESITY (BMI 30.0-34.9): ICD-10-CM

## 2020-04-24 DIAGNOSIS — H93.A1 PULSATILE TINNITUS OF RIGHT EAR: ICD-10-CM

## 2020-04-24 DIAGNOSIS — R51.9 CHRONIC RIGHT-SIDED HEADACHE: Primary | ICD-10-CM

## 2020-04-24 DIAGNOSIS — G43.909 MIGRAINE WITHOUT STATUS MIGRAINOSUS, NOT INTRACTABLE, UNSPECIFIED MIGRAINE TYPE: ICD-10-CM

## 2020-04-24 DIAGNOSIS — G89.29 CHRONIC RIGHT-SIDED HEADACHE: Primary | ICD-10-CM

## 2020-04-24 RX ORDER — ACETAZOLAMIDE 250 MG/1
250 TABLET ORAL 2 TIMES DAILY
COMMUNITY
End: 2020-09-22 | Stop reason: DRUGHIGH

## 2020-04-24 RX ORDER — LEVOTHYROXINE SODIUM 125 UG/1
0.12 TABLET ORAL DAILY
COMMUNITY
Start: 2020-03-22 | End: 2021-10-04

## 2020-04-24 RX ORDER — ONDANSETRON 4 MG/1
TABLET, ORALLY DISINTEGRATING ORAL
COMMUNITY
Start: 2020-03-02 | End: 2021-10-04

## 2020-04-24 RX ORDER — CETIRIZINE HCL 10 MG
10 TABLET ORAL DAILY
COMMUNITY
End: 2021-10-04

## 2020-04-24 RX ORDER — BUPROPION HYDROCHLORIDE 300 MG/1
300 TABLET ORAL DAILY
COMMUNITY

## 2020-04-24 RX ORDER — MECLIZINE HYDROCHLORIDE 25 MG/1
TABLET ORAL
COMMUNITY
End: 2021-10-04

## 2020-04-24 RX ORDER — ALPRAZOLAM 0.5 MG/1
0.5 TABLET ORAL
COMMUNITY
Start: 2020-03-18

## 2020-04-24 NOTE — PROGRESS NOTES
Phone call to patient in preparation for Virtual visit with provider. Name and  verified with patient. New patient referred by Dr Dylon Romero presenting with pulsatile tinnitus. Patient reports intermittent whooshing sound in right ear with pain radiating from right eyebrow down to right side of her neck. Pain can radiate to the back of head and back of her neck. Reports pain is stabbing at times. Symptoms for the past 5 months. Reports some lightheadedness and N/V. Denies any blurred or double vision. Vital signs not taken as patient dis nor have the equipment at home.

## 2020-04-24 NOTE — PROGRESS NOTES
Neurointerventional Surgery Consult    Patient: Amanda Finley MRN: [id number]  SSN: [id number]    YOB: 1985  Age: 27 y.o. Sex: female      Subjective:      Amanda Finley is a 27 y.o. female referred by Dr. Nneka Valdez for right sided pulsatile tinnitus. This patient experienced an acute episode of vertigo, nausea and vomiting while at work ~ 5 months ago. This evolved into a severe right posterior headache that resolved the next morning after sleeping all night. That is when she first noticed pulsatile noise in her right ear. The pulsatile tinnitus has been present continuously since that time and she reports episodes of vertigo/nausea and right posterior headache approximately once weekly now. The headaches occasionally radiate into the right frontal region. They are not associated with visual changes. The pulsatile noise in her right ear does not change with positioning but she can make it subside with \"firm pressure\" on the right anterior neck. Light pressure is not effective. \"I have to press pretty hard to make it go away\". She reports no new medications, viral illnesses, oral contraceptives, or trauma at the time of her initial symptoms. Dr. Angie Huertas placed her on Diamox 250 mg po BID for possible benign intracranial hypertension. The symptoms have not subsided on this starting dose. She reports mild side effects that are improving \"tingling in my hands and feet\". Perioral tingling no longer occurs. Dr. Angie Huertas also ordered at CTA that was performed at [facility]. This is not available for direct review but the report is normal.     She reports \"migraine headaches\" since age 14 that start at the top of her head and move into the bilateral frontal regions. The new right sided headaches are distinctly different than her normal migraines. Her last eye exam was in July 2019 and was normal by patient report. No records are available.      Past Medical History:   Diagnosis Date  Anxiety disorder     Fatigue     Headache     H/O migraines    N&V (nausea and vomiting)     Neuropathy     Ringing in ear, right     SOB (shortness of breath)     Thyroid disease     Vertigo      History reviewed. No pertinent surgical history. Family History   Problem Relation Age of Onset   [facility] Mother     Other Father         cerebral aneurysm    Other Maternal Grandfather         Brain tumor     Social History     Tobacco Use    Smoking status: Never Smoker    Smokeless tobacco: Never Used   Substance Use Topics    Alcohol use: Yes      Current Outpatient Medications   Medication Sig Dispense Refill    levothyroxine (SYNTHROID) 125 mcg tablet Take 0.125 mcg by mouth daily.  buPROPion XL (WELLBUTRIN XL) 300 mg XL tablet Take 300 mg by mouth daily.  meclizine (ANTIVERT) 25 mg tablet meclizine 25 mg tablet   Take 1 tablet 3 times a day by oral route as needed.  ondansetron (ZOFRAN ODT) 4 mg disintegrating tablet DISSOLVE 1 TABLET BY MOUTH TWICE A DAY AS NEEDED      ALPRAZolam (XANAX) 0.5 mg tablet Take 0.5 mg by mouth nightly.  cetirizine (ZyrTEC) 10 mg tablet Take 10 mg by mouth daily.  acetaZOLAMIDE (DIAMOX) 250 mg tablet Take 250 mg by mouth two (2) times a day. No Known Allergies    Review of Systems:  A comprehensive review of systems was negative except for that written in the History of Present Illness. Objective:     Vitals:    04/24/20 1003   Weight: 180 lb (81.6 kg)   Height: 5' 3\" (1.6 m)        Physical Exam:  GENERAL: alert, cooperative, no distress, appears stated age    Neurologic Exam:  Mental Status:  Alert and oriented x 4. Appropriate affect, mood and behavior. Language:    Normal fluency, repetition, comprehension and naming. Cranial Nerves:          Extraocular movements intact. Facial sensation intact V1 - V3. Full facial strength, no asymmetry. Hearing intact bilaterally. No dysarthria.  Tongue protrudes to midline, palate elevates symmetrically. Shoulder shrug 5/5 bilaterally. Motor:    No pronator drift. Bulk and tone normal.      5/5 power in all extremities proximally and distally. No involuntary movements. Sensation:    Sensation intact throughout to light touch    Coordination & Gait: Tandem gait, FTN and ELADIO are normal.       Imaging:    CTA imaging from [facility] is being obtained for direct review. Assessment and Plan:     Continuous right sided pulsatile tinnitus. Right sided headaches once weekly. No response to starting dose of Diamox. DDx:  Pseudotumor cerebri/IIH or dural AV fistula. The next step is review of the CTA ordered by Dr. Lolly Jo which we hope to have later today. This patient may need additional imaging (MRA) vs diagnostic cerebral angiography for definitive diagnosis. This was discussed briefly today. Referral to [facility] to assess for papilledema. Patient was counseled on the efficacy of weight loss and expressed understanding. Current BMI 31.89. Followup in 2 weeks with recommendations. Thank you for this consult and participating in the care of this patient. I have discussed the diagnosis with the patient and the intended plan as seen in the above orders. Patient is in agreement.       Signed By: Steven Boston MD     April 24, 2020

## 2020-04-24 NOTE — PATIENT INSTRUCTIONS
Your right sided pulsatile tinnitus may be caused by a condition called benign intracranial hypertension (pseudotumor cerebri). This occurs when fluid produced by the brain (CSF) in not resorbed properly. Diamox (acetazolamide) is a medication that improves the condition in most cases. In some cases, the cause of intracranial hypertension is narrow of large veins in the back of the head. In rare cases, the pulsatile noise is caused by an abnormal connection between arteries and veins called a dural arteriovenous fistula. Dr. Daxa Gonzalez will review the CTA performed for Dr. Adonay Saravia and make further recommendations. Please make an appointment for an eye exam at [facility] to look for swelling of your optic discs, an indication that intracranial hypertension could affect your eyesight. Patients usually see improvement in their symptoms with weight loss. A Healthy Lifestyle: Care Instructions Your Care Instructions A healthy lifestyle can help you feel good, stay at a healthy weight, and have plenty of energy for both work and play. A healthy lifestyle is something you can share with your whole family. A healthy lifestyle also can lower your risk for serious health problems, such as high blood pressure, heart disease, and diabetes. You can follow a few steps listed below to improve your health and the health of your family. Follow-up care is a key part of your treatment and safety. Be sure to make and go to all appointments, and call your doctor if you are having problems. It's also a good idea to know your test results and keep a list of the medicines you take. How can you care for yourself at home? · Do not eat too much sugar, fat, or fast foods. You can still have dessert and treats now and then. The goal is moderation. · Start small to improve your eating habits. Pay attention to portion sizes, drink less juice and soda pop, and eat more fruits and vegetables. ? Eat a healthy amount of food. A 3-ounce serving of meat, for example, is about the size of a deck of cards. Fill the rest of your plate with vegetables and whole grains. ? Limit the amount of soda and sports drinks you have every day. Drink more water when you are thirsty. ? Eat at least 5 servings of fruits and vegetables every day. It may seem like a lot, but it is not hard to reach this goal. A serving or helping is 1 piece of fruit, 1 cup of vegetables, or 2 cups of leafy, raw vegetables. Have an apple or some carrot sticks as an afternoon snack instead of a candy bar. Try to have fruits and/or vegetables at every meal. 
· Make exercise part of your daily routine. You may want to start with simple activities, such as walking, bicycling, or slow swimming. Try to be active 30 to 60 minutes every day. You do not need to do all 30 to 60 minutes all at once. For example, you can exercise 3 times a day for 10 or 20 minutes. Moderate exercise is safe for most people, but it is always a good idea to talk to your doctor before starting an exercise program. 
· Keep moving. Acevedo Whitt the lawn, work in the garden, or [employer]. Take the stairs instead of the elevator at work. · If you smoke, quit. People who smoke have an increased risk for heart attack, stroke, cancer, and other lung illnesses. Quitting is hard, but there are ways to boost your chance of quitting tobacco for good. ? Use nicotine gum, patches, or lozenges. ? Ask your doctor about stop-smoking programs and medicines. ? Keep trying. In addition to reducing your risk of diseases in the future, you will notice some benefits soon after you stop using tobacco. If you have shortness of breath or asthma symptoms, they will likely get better within a few weeks after you quit. · Limit how much alcohol you drink. Moderate amounts of alcohol (up to 2 drinks a day for men, 1 drink a day for women) are okay.  But drinking too much can lead to liver problems, high blood pressure, and other health problems. Family health If you have a family, there are many things you can do together to improve your health. · Eat meals together as a family as often as possible. · Eat healthy foods. This includes fruits, vegetables, lean meats and dairy, and whole grains. · Include your family in your fitness plan. Most people think of activities such as jogging or tennis as the way to fitness, but there are many ways you and your family can be more active. Anything that makes you breathe hard and gets your heart pumping is exercise. Here are some tips: 
? Walk to do errands or to take your child to school or the bus. 
? Go for a family bike ride after dinner instead of watching TV. Where can you learn more? Go to http://gustavo-chema.info/ Enter N274 in the search box to learn more about \"A Healthy Lifestyle: Care Instructions. \" Current as of: May 28, 2019Content Version: 12.4 © 9590-2310 [employer]. Care instructions adapted under license by [employer] (which disclaims liability or warranty for this information). If you have questions about a medical condition or this instruction, always ask your healthcare professional. [address] any warranty or liability for your use of this information.

## 2020-04-30 ENCOUNTER — TELEPHONE (OUTPATIENT)
Dept: NEUROSURGERY | Age: 35
End: 2020-04-30

## 2020-04-30 NOTE — TELEPHONE ENCOUNTER
Returned message from patient regarding headaches and blurred vision. Patient reports Blurred vision in her right eye and headaches on the top of her head for the past two days. Reports one episode of her upper body being hot and flushed. This lasted about 3 hours yesterday. Patient prefers not to go to ED. Spoke to provider who recommended patient see Ophthalmology @ [facility] as referred ASAP or patient will need to go to ED for evaluation. Patient informed of this and stated understanding.

## 2020-07-28 ENCOUNTER — DOCUMENTATION ONLY (OUTPATIENT)
Dept: NEUROSURGERY | Age: 35
End: 2020-07-28

## 2020-07-28 NOTE — PROGRESS NOTES
I received medical records from [facility] from recent clinical ophthalmologic examination dated 7/17/2020 (Dr. Jerris Krabbe). No optic disc edema was seen. This is apparently unchanged from baseline. Apparently some of the diagnostic testing suggests optic neuropathy. Clarification is required. Please arrange a phone call between myself and Dr. 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

## 2020-07-30 ENCOUNTER — TELEPHONE (OUTPATIENT)
Dept: NEUROSURGERY | Age: 35
End: 2020-07-30

## 2020-07-30 NOTE — TELEPHONE ENCOUNTER
MOHINDER   Patient returned call about scheduling follow up appointment. Patient has copy of CTA she had completed at [facility] [address]. The office was closed until the middle of June so we were unable to retrieve disc. Once she brings the images in I will schedule follow up visit.

## 2020-09-08 ENCOUNTER — OFFICE VISIT (OUTPATIENT)
Dept: NEUROSURGERY | Age: 35
End: 2020-09-08

## 2020-09-08 VITALS
TEMPERATURE: 99.3 F | BODY MASS INDEX: 31.71 KG/M2 | SYSTOLIC BLOOD PRESSURE: 142 MMHG | WEIGHT: 179 LBS | HEART RATE: 64 BPM | DIASTOLIC BLOOD PRESSURE: 88 MMHG | HEIGHT: 63 IN | RESPIRATION RATE: 16 BRPM

## 2020-09-08 DIAGNOSIS — G43.909 MIGRAINE WITHOUT STATUS MIGRAINOSUS, NOT INTRACTABLE, UNSPECIFIED MIGRAINE TYPE: Primary | ICD-10-CM

## 2020-09-08 DIAGNOSIS — G89.29 CHRONIC RIGHT-SIDED HEADACHE: ICD-10-CM

## 2020-09-08 DIAGNOSIS — E66.9 OBESITY (BMI 30.0-34.9): ICD-10-CM

## 2020-09-08 DIAGNOSIS — G93.2 IDIOPATHIC INTRACRANIAL HYPERTENSION: ICD-10-CM

## 2020-09-08 DIAGNOSIS — R51.9 CHRONIC RIGHT-SIDED HEADACHE: ICD-10-CM

## 2020-09-08 DIAGNOSIS — H93.A1 PULSATILE TINNITUS OF RIGHT EAR: ICD-10-CM

## 2020-09-08 PROCEDURE — 99215 OFFICE O/P EST HI 40 MIN: CPT | Performed by: RADIOLOGY

## 2020-09-08 RX ORDER — ACETAZOLAMIDE 500 MG/1
500 CAPSULE, EXTENDED RELEASE ORAL 2 TIMES DAILY
COMMUNITY
End: 2020-09-22 | Stop reason: ALTCHOICE

## 2020-09-08 RX ORDER — ASPIRIN 81 MG/1
162 TABLET ORAL DAILY
Qty: 60 TAB | Refills: 5 | Status: SHIPPED | OUTPATIENT
Start: 2020-09-08

## 2020-09-08 NOTE — PATIENT INSTRUCTIONS
I suspect that you have pseudotumor cerebri/idiopathic intracranial hypertension. Your opening pressure of 24.9 cm on the recent lumbar puncture and vision changes with headaches are consistent with this diagnosis. However, the lack of optic disc swelling/papilledema is discordant. At this point in time, you are not a candidate for intracranial venous sinus stenting. Per your request, I am referring you to Dr. Aurelio Graves in neurology at [address] [place] [address] for further evaluation and optimization of your medical therapy. Congratulations on the 15 pound weight loss. I think that you can still benefit from further weight loss as your BMI is currently 31.7. Weight loss often improves or resolves symptoms of pseudotumor cerebri. Start aspirin 162 mg p.o. daily for venous sinus stenosis. Avoid COVID-19 by following CDC guidelines. COVID-19 is associated with increased risk of blood clots and stroke. I will see you back in the office in 6 months for clinical follow-up unless Dr. Aayush Miller or your ophthalmologist demonstrates failure of medical therapy or impending vision loss. Impending vision loss is a indication for stenting of your dominant right venous sinus. Narrowing of the dominant right venous sinus is the probable cause of your pulsatile tinnitus. Pulsatile tinnitus may resolve with weight loss and medical treatment of your intracranial hypertension.

## 2020-09-08 NOTE — PROGRESS NOTES
Neurointerventional Surgery  Ambulatory Progress Note      Patient: Krystal Obando MRN: [id number]  SSN: [id number]    YOB: 1985  Age: 38 y.o. Sex: female      History of Present Illness:      Krystal Obando presents for right-sided pulsatile tinnitus clinical follow-up today. Since her last visit, she has seen Dr. Sahil Christianson (neuro-ophthalmology) at [facility] and undergone a lumbar puncture at [facility].  The patient showed me the official report of the lumbar puncture which demonstrated an opening pressure of 24.9. No closing pressure was recorded. 20 cc of CSF was drained. The patient experienced transient improvement in the \"pressure in the right eye\" after the procedure. Right sided pulsatile tinnitus actually increased after the procedure. Unfortunately, the procedure was complicated by CSF leak and the patient underwent a successful blood patch 3 days later. She was on 250 mg of Diamox p.o. twice daily (prescribed by Dr. Lisa Thrasher) at the time but independently increased her dose to 500 mg p.o. twice daily after the procedure. She was prescribed Topamax by Dr. Sahil Christianson for suspected migraine but independently discontinued the medication 2 days later because of \"severe depression symptoms\". As of today, the patient's symptoms are essentially unchanged compared to her previous visit. She is scheduled to see Dr. Neil Mann at [facility] in 2 weeks for routine follow-up. I was able to obtain the previous records from Dr. Hilary Dumont dated 7/17/2020 (Dr. Neil Mann). No optic disc edema was seen. This is apparently unchanged from baseline. Subsequent diagnostic testing suggested optic neuropathy. Clarification is required. I was unable to discuss the optic neuropathy finding with Dr. Hilary Dumont prior to this visit today. We are currently trying to connect with him.     The patient also underwent MRI and MRA imaging at [facility] on 6/15/2020 interpreted by Dr. Hernando Huitron. Those images are not available for direct review but the reports indicate  \"mild flattening of the pituitary gland to the sellar floor. Prominent bilateral optic nerve sheaths. Suggested symmetric narrowing of the distal transverse sinuses. Constellation of findings are nonspecific, however in a patient this age may represent sequela of idiopathic intracranial hypertension. \"    The symptoms that are most disrupting to the patient's life currently are frequent headaches and right-sided pulsatile tinnitus. Sleeping with her head slightly elevated does improve symptoms slightly. Light compression of the right jugular vein resolves the pulsatile tinnitus as expected (venous). She also reports pain in her neck and episodes of dizziness. \"When its bad its bad\". She reports a headache yesterday that progressed to nausea and vomiting. History today was obtained from the patient and from a young woman that accompanied her to the visit. They are both excellent historians. Patient Active Problem List   Diagnosis Code    Pulsatile tinnitus of right ear H93. A1    Obesity (BMI 30.0-34. 9) E66.9    Chronic right-sided headache R51    Migraine G43.909    Idiopathic intracranial hypertension G93.2        Review of Systems    A comprehensive ROS was performed and was negative except for as per HPI. Objective:     Current Outpatient Medications   Medication Sig Dispense Refill    acetaZOLAMIDE SR (Diamox Sequels) 500 mg capsule Take 500 mg by mouth two (2) times a day.  aspirin delayed-release 81 mg tablet Take 2 Tabs by mouth daily. Indications: prevention for a blood clot going to the brain, rheumatoid arthritis, venous sinus stenosis 60 Tab 5    levothyroxine (SYNTHROID) 125 mcg tablet Take 0.125 mcg by mouth daily.  buPROPion XL (WELLBUTRIN XL) 300 mg XL tablet Take 300 mg by mouth daily.       meclizine (ANTIVERT) 25 mg tablet meclizine 25 mg tablet Take 1 tablet 3 times a day by oral route as needed.  ondansetron (ZOFRAN ODT) 4 mg disintegrating tablet DISSOLVE 1 TABLET BY MOUTH TWICE A DAY AS NEEDED      ALPRAZolam (XANAX) 0.5 mg tablet Take 0.5 mg by mouth nightly.  cetirizine (ZyrTEC) 10 mg tablet Take 10 mg by mouth daily.  acetaZOLAMIDE (DIAMOX) 250 mg tablet Take 250 mg by mouth two (2) times a day. Visit Vitals  /88 (BP 1 Location: Left arm, BP Patient Position: Sitting)   Pulse 64   Temp 99.3 °F (37.4 °C) (Temporal)   Resp 16   Ht 5' 3\" (1.6 m)   Wt 179 lb (81.2 kg)   BMI 31.71 kg/m²       Allergies   Allergen Reactions    Codeine Nausea and Vomiting    Percocet [Oxycodone-Acetaminophen] Nausea and Vomiting       Current Outpatient Medications   Medication Sig    acetaZOLAMIDE SR (Diamox Sequels) 500 mg capsule Take 500 mg by mouth two (2) times a day.  aspirin delayed-release 81 mg tablet Take 2 Tabs by mouth daily. Indications: prevention for a blood clot going to the brain, rheumatoid arthritis, venous sinus stenosis    levothyroxine (SYNTHROID) 125 mcg tablet Take 0.125 mcg by mouth daily.  buPROPion XL (WELLBUTRIN XL) 300 mg XL tablet Take 300 mg by mouth daily.  meclizine (ANTIVERT) 25 mg tablet meclizine 25 mg tablet   Take 1 tablet 3 times a day by oral route as needed.  ondansetron (ZOFRAN ODT) 4 mg disintegrating tablet DISSOLVE 1 TABLET BY MOUTH TWICE A DAY AS NEEDED    ALPRAZolam (XANAX) 0.5 mg tablet Take 0.5 mg by mouth nightly.  cetirizine (ZyrTEC) 10 mg tablet Take 10 mg by mouth daily.  acetaZOLAMIDE (DIAMOX) 250 mg tablet Take 250 mg by mouth two (2) times a day. No current facility-administered medications for this visit. Physical Exam:  General: NAD    Neurologic Exam:  Mental Status:  Alert and oriented x 4. Appropriate affect, mood and behavior. Language:    Normal fluency, repetition, comprehension and naming.     Cranial Nerves:   Pupils equal, round and reactive to light. Visual fields full to confrontation. Extraocular movements intact. The patient experiences noticeable discomfort with upper gaze. Facial sensation intact V1 - V3. Full facial strength, no asymmetry. Hearing intact bilaterally. No dysarthria. Tongue protrudes to midline, palate elevates symmetrically. Shoulder shrug 5/5 bilaterally. Motor:    No pronator drift. Bulk and tone normal.      5/5 power in all extremities proximally and distally. No involuntary movements. Sensation:    Sensation intact throughout to light touch. No neglect. Romberg is negative. Coordination & Gait: Normal, tandem gait normal, FTN and HTS intact. Labs:  No results found for: NA, K, CL, CO2, AGAP, GLU, BUN, CREA, BUCR, GFRAA, GFRNA, CA, GFRAA  No results found for: WBC, WBCLT, HGBPOC, HGB, HGBP, HCTPOC, HCT, PHCT, RBCH, PLT, MCV, HGBEXT, HCTEXT, PLTEXT  No results found for: MCH, MCHC, BASOS, ABL, ABM, LAKEISHA, ABB, PHOS, MG    IMAGING:    CTA performed in June 2020 (ordered by Dr. Darlin Gold) was provided by the patient and reviewed independently. This demonstrates a dominant right transverse sigmoid sinus system with severe stenosis distally in the transverse sinus, just proximal to the sigmoid junction. There are 2 low-density filling defects in this region which may represent arachnoid villi. Chronic thrombus cannot be excluded. The former is favored because of the round morphology. The nondominant left transverse sinus is also severely stenotic distally. On the sagittal images, there is definite flattening of the pituitary gland and inferior displacement of the optic chiasm. The right middle meningeal artery is normal.  There is no obvious vascular malformation. Assessment/Plan:       ICD-10-CM ICD-9-CM    1.  Migraine without status migrainosus, not intractable, unspecified migraine type  G43.909 346.90 REFERRAL TO NEUROLOGY   2. Pulsatile tinnitus of right ear  H93. A1 388.30    3. Obesity (BMI 30.0-34. 9)  E66.9 278.00    4. Chronic right-sided headache  R51 784.0 REFERRAL TO NEUROLOGY   5. Idiopathic intracranial hypertension  G93.2 348.2 REFERRAL TO NEUROLOGY       In summary, I think this patient has pseudotumor cerebri/idiopathic intracranial hypertension that is suboptimally treated with medical therapy and weight loss. She recently lost 15 pounds with no improvement in her symptoms and has more recently increased her Diamox dose (originally prescribed by Dr. Thedora Fabry) from 250 mg to 500 mg twice daily without improvement. A short interval trial of Topamax was unsuccessful because of depressive symptoms. Her opening pressure of 24.9 is marginal but definitely in the range of pseudotumor (I was shown this report by the patient on her cell phone). The discordant finding in the medical reports I received is the absence of optic disc edema. The patient saw Dr. Neil Lloyd at [facility]. After this visit on 7/17/2020, subsequent diagnostic testing at  suggested a possible \"optic neuropathy\". The patient is scheduled to see Dr. Neil Lloyd at [facility] in 2 weeks. We will obtain those records when available. Per patient report, Dr. Faizan Calloway [facility]. [facility] neuro-ophthalmology) favors migraine as a primary diagnosis. His notes are not available for my direct review. The patient has requested a referral to [facility] neurology for optimization of her medical therapy and a second neurological opinion. At this point, without documentation of impending vision loss or refractory papilledema with maximum medical therapy, this patient is not eligible for venous sinus stenting. 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 This can often be seen in the setting of idiopathic intracranial hypertension and can even improve when pressures lower.   It is also possible that this is the primary cause of her pseudotumor and if so she will likely fail medical therapy and require stenting for definitive treatment in the future. Her pulsatile tinnitus is likely caused by dominant right transverse sinus stenosis or compression and pseudotumor cerebri. Resolution of this symptom with light compression of the jugular vein is reassuring that no intracranial AV shunt is present. No cerebral angiogram is indicated for the pulsatile tinnitus at this time. I will see her in 6 months (sooner if necessary) after she has followed up with Dr. Norma Tobar and seen Dr. Vincent Goff at [facility] neurology for a second opinion. Thank you for the opportunity to participate in this pleasant patient's care. Follow-up Disposition:    I have discussed the diagnosis with the patient and the intended plan as seen in the above orders. Patient is in agreement. The patient has received an after-visit summary and questions were answered concerning future plans. I have discussed medication side effects and warnings with the patient as well.       Alanna Llamas MD

## 2020-09-22 ENCOUNTER — TELEPHONE (OUTPATIENT)
Dept: NEUROLOGY | Facility: CLINIC | Age: 35
End: 2020-09-22

## 2020-09-22 ENCOUNTER — OFFICE VISIT (OUTPATIENT)
Dept: NEUROLOGY | Facility: CLINIC | Age: 35
End: 2020-09-22
Payer: COMMERCIAL

## 2020-09-22 VITALS
HEIGHT: 63 IN | OXYGEN SATURATION: 98 % | HEART RATE: 90 BPM | SYSTOLIC BLOOD PRESSURE: 140 MMHG | BODY MASS INDEX: 31.71 KG/M2 | WEIGHT: 179 LBS | DIASTOLIC BLOOD PRESSURE: 84 MMHG

## 2020-09-22 DIAGNOSIS — G93.2 IDIOPATHIC INTRACRANIAL HYPERTENSION: Primary | ICD-10-CM

## 2020-09-22 DIAGNOSIS — H93.A1 PULSATILE TINNITUS OF RIGHT EAR: ICD-10-CM

## 2020-09-22 DIAGNOSIS — G43.709 CHRONIC MIGRAINE W/O AURA W/O STATUS MIGRAINOSUS, NOT INTRACTABLE: ICD-10-CM

## 2020-09-22 DIAGNOSIS — I67.9 INTRACRANIAL VASCULAR STENOSIS: ICD-10-CM

## 2020-09-22 PROCEDURE — 99244 OFF/OP CNSLTJ NEW/EST MOD 40: CPT | Performed by: PSYCHIATRY & NEUROLOGY

## 2020-09-22 RX ORDER — FREMANEZUMAB-VFRM 225 MG/1.5ML
1 INJECTION SUBCUTANEOUS
Qty: 1 SYRINGE | Refills: 5 | Status: SHIPPED | OUTPATIENT
Start: 2020-09-22 | End: 2021-10-04

## 2020-09-22 RX ORDER — FREMANEZUMAB-VFRM 225 MG/1.5ML
1 INJECTION SUBCUTANEOUS
Qty: 1 SYRINGE | Refills: 0 | Status: SHIPPED | COMMUNITY
Start: 2020-09-22 | End: 2020-09-22

## 2020-09-22 RX ORDER — TOPIRAMATE 25 MG/1
TABLET ORAL
COMMUNITY
Start: 2020-07-13 | End: 2020-09-22 | Stop reason: SINTOL

## 2020-09-22 RX ORDER — BUPROPION HYDROCHLORIDE 100 MG/1
TABLET, EXTENDED RELEASE ORAL
COMMUNITY
End: 2020-09-22 | Stop reason: SDUPTHER

## 2020-09-22 RX ORDER — SIMVASTATIN 20 MG/1
TABLET, FILM COATED ORAL
COMMUNITY
End: 2021-10-04

## 2020-09-22 RX ORDER — ACETAZOLAMIDE 250 MG/1
750 TABLET ORAL 2 TIMES DAILY
Qty: 180 TAB | Refills: 3 | Status: SHIPPED | OUTPATIENT
Start: 2020-09-22 | End: 2020-12-21

## 2020-09-22 RX ORDER — BISMUTH SUBSALICYLATE 262 MG
1 TABLET,CHEWABLE ORAL DAILY
COMMUNITY
End: 2021-10-04

## 2020-09-22 RX ORDER — BUTALBITAL, ACETAMINOPHEN AND CAFFEINE 50; 325; 40 MG/1; MG/1; MG/1
TABLET ORAL
COMMUNITY
Start: 2020-07-12 | End: 2021-10-04

## 2020-09-22 NOTE — TELEPHONE ENCOUNTER
Re: Douglas Hatfield approved     Approval rec'd from [facility] via verbal submission.     Effective 09/22/2020-12/21/20  Auth # [id number]    Fax sent to [facility]

## 2020-09-22 NOTE — PROGRESS NOTES
Chief Complaint   Patient presents with    Neurologic Problem     [facility], previously managed by Dr. Cyrus Delcid       Referred by: Lauri Frost is a 48-year-old woman referred to me for pseudotumor. She is here with her partner. She tells me around December 2019 she began to notice pulsatile tinnitus on the right and then of March of this year began to have increasing frequent headaches diffuse throbbing pounding pain with nausea and light sensitivity. She has a history of previous migraine headaches and this is different. Ultimately she had a spinal tap done with elevated pressure of 24.9. This was complicated by a CSF leak requiring a blood patch. She had been given a trial of topiramate but had significant side effects. Now she is on Diamox 500 twice daily experiencing paresthesias and taste changes. She saw Dr. Braulio Matias recently and comments from his note are below. 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 She sees ophthalmology at [address] [place] [address] but does not feel they have been very invested. She denies any current double vision. She feels tremulous throughout. A lot of anxiety on Wellbutrin and Xanax as needed. Per Dr. Braulio Matias note:  \". ..the previous records from Dr. Veronika Francois dated 7/17/2020 (Dr. Liam Lopez). No optic disc edema was seen. This is apparently unchanged from baseline. Subsequent diagnostic testing suggested optic neuropathy. \"    \". Nic Wilson At this point, without documentation of impending vision loss or refractory papilledema with maximum medical therapy, this patient is not eligible for venous sinus stenting. 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 This can often be seen in the setting of idiopathic intracranial hypertension and can even improve when pressures lower.   It is also possible that this is the primary cause of her pseudotumor and if so she will likely fail medical therapy and require stenting for definitive treatment in the future. Her pulsatile tinnitus is likely caused by dominant right transverse sinus stenosis or compression and pseudotumor cerebri. Resolution of this symptom with light compression of the jugular vein is reassuring that no intracranial AV shunt is present. No cerebral angiogram is indicated for the pulsatile tinnitus at this time. \"        Review of Systems   Constitutional: Positive for malaise/fatigue. Eyes: Positive for blurred vision and photophobia. Negative for double vision. Gastrointestinal: Positive for nausea. Neurological: Positive for tremors and headaches. Psychiatric/Behavioral: The patient is nervous/anxious. All other systems reviewed and are negative. Past Medical History:   Diagnosis Date    Anxiety disorder     Fatigue     Headache     H/O migraines    Heart abnormality     heart murmur, pt states. does not see cardiologist for this.     Heart murmur     N&V (nausea and vomiting)     Neuropathy     Polycystic disease, ovaries     Polycystic ovary syndrome     Ringing in ear, right     SOB (shortness of breath)     Thyroid disease     Vertigo      Family History   Problem Relation Age of Onset    Cancer Mother     Other Father         cerebral aneurysm    Other Maternal Grandfather         Brain tumor    Hypertension Mother     Diabetes Mother     Stroke Father      Social History     Socioeconomic History    Marital status: SINGLE     Spouse name: Not on file    Number of children: Not on file    Years of education: Not on file    Highest education level: Not on file   Occupational History    Not on file   Social Needs    Financial resource strain: Not on file    Food insecurity     Worry: Not on file     Inability: Not on file    Transportation needs     Medical: Not on file     Non-medical: Not on file   Tobacco Use    Smoking status: Current Every Day Smoker    Smokeless tobacco: Never Used    Tobacco comment: vapes   Substance and Sexual Activity    Alcohol use: Yes    Drug use: No    Sexual activity: Yes     Partners: Male   Lifestyle    Physical activity     Days per week: Not on file     Minutes per session: Not on file    Stress: Not on file   Relationships    Social connections     Talks on phone: Not on file     Gets together: Not on file     Attends Gnosticism service: Not on file     Active member of club or organization: Not on file     Attends meetings of clubs or organizations: Not on file     Relationship status: Not on file    Intimate partner violence     Fear of current or ex partner: Not on file     Emotionally abused: Not on file     Physically abused: Not on file     Forced sexual activity: Not on file   Other Topics Concern    Not on file   Social History Narrative    ** Merged History Encounter **          Current Outpatient Medications   Medication Sig    simvastatin (ZOCOR) 20 mg tablet simvastatin 20 mg tablet    butalbital-acetaminophen-caffeine (FIORICET, ESGIC) -40 mg per tablet TAKE 1 TABLET BY MOUTH EVERY 4 HOURS AS NEEDED FOR HEADACHE    multivitamin (ONE A DAY) tablet Take 1 Tab by mouth daily.  fremanezumab-vfrm (Ajovy Autoinjector) 225 mg/1.5 mL atIn 1 Syringe by SubCUTAneous route now for 1 dose.  fremanezumab-vfrm (Ajovy Autoinjector) 225 mg/1.5 mL atIn 1 Syringe by SubCUTAneous route every thirty (30) days.  acetaZOLAMIDE (DIAMOX) 250 mg tablet Take 3 Tabs by mouth two (2) times a day.  aspirin delayed-release 81 mg tablet Take 2 Tabs by mouth daily. Indications: prevention for a blood clot going to the brain, rheumatoid arthritis, venous sinus stenosis    levothyroxine (SYNTHROID) 125 mcg tablet Take 0.125 mcg by mouth daily.  buPROPion XL (WELLBUTRIN XL) 300 mg XL tablet Take 300 mg by mouth daily.  meclizine (ANTIVERT) 25 mg tablet meclizine 25 mg tablet   Take 1 tablet 3 times a day by oral route as needed.     ALPRAZolam (XANAX) 0.5 mg tablet Take 0.5 mg by mouth nightly.  cetirizine (ZyrTEC) 10 mg tablet Take 10 mg by mouth daily.  ondansetron (ZOFRAN ODT) 4 mg disintegrating tablet DISSOLVE 1 TABLET BY MOUTH TWICE A DAY AS NEEDED    INTRAUTERINE DEVICE, IUD, IU by IntraUTERine route. No current facility-administered medications for this visit. Allergies   Allergen Reactions    Codeine Hives    Percocet [Oxycodone-Acetaminophen] Hives    Codeine Nausea and Vomiting    Percocet [Oxycodone-Acetaminophen] Nausea and Vomiting         Neurologic Exam     Mental Status   Oriented to person, place, and time. Cranial Nerves   Cranial nerves II through XII intact. Optic disc edema not clearly seen, no motility deficits     Motor Exam   Muscle bulk: normal    Strength   Strength 5/5 throughout. Sensory Exam   Light touch normal.     Gait, Coordination, and Reflexes     Gait  Gait: normal    Coordination   Finger to nose coordination: normal    Tremor   Resting tremor: absent    Reflexes   Right brachioradialis: 3+  Left brachioradialis: 3+  Right biceps: 3+  Left biceps: 3+  Right patellar: 3+  Left patellar: 3+  Right achilles: 2+  Left achilles: 2+    Physical Exam   Constitutional: She is oriented to person, place, and time. She appears well-developed and well-nourished. Cardiovascular: Normal rate. Pulmonary/Chest: Effort normal.   Neurological: She is oriented to person, place, and time. She has normal strength. She has a normal Finger-Nose-Finger Test. Gait normal.   Reflex Scores:       Bicep reflexes are 3+ on the right side and 3+ on the left side. Brachioradialis reflexes are 3+ on the right side and 3+ on the left side. Patellar reflexes are 3+ on the right side and 3+ on the left side. Achilles reflexes are 2+ on the right side and 2+ on the left side. Skin: Skin is warm and dry. Psychiatric: Her behavior is normal.   Vitals reviewed.     Visit Vitals  BP (!) 140/84 (BP 1 Location: Right arm, BP Patient Position: Sitting)   Pulse 90   Ht 5' 3\" (1.6 m)   Wt 81.2 kg (179 lb)   LMP 09/01/2020 (Approximate)   SpO2 98%   BMI 31.71 kg/m²       Lab Results   Component Value Date/Time    WBC 10.4 01/25/2017 08:42 AM    HGB 12.3 01/25/2017 08:42 AM    HCT 38.4 01/25/2017 08:42 AM    PLATELET 729 96/68/9195 08:42 AM    MCV 87.3 01/25/2017 08:42 AM     Lab Results   Component Value Date/Time    Glucose 115 (H) 01/25/2017 08:42 AM    Creatinine 0.82 01/25/2017 08:42 AM      No results found for: CHOL, CHOLPOCT, HDL, LDL, LDLC, LDLCPOC, LDLCEXT, TRIGL, TGLPOCT, CHHD, CHHDX  Lab Results   Component Value Date/Time    ALT (SGPT) 24 01/25/2017 08:42 AM    Alk. phosphatase 64 01/25/2017 08:42 AM    Bilirubin, total 0.3 01/25/2017 08:42 AM    Albumin 4.2 01/25/2017 08:42 AM    Protein, total 7.9 01/25/2017 08:42 AM    PLATELET 708 09/58/1318 08:42 AM        CT Results (maximum last 3): Results from [place] encounter on 06/11/18   CT LOW EXT RT WO CONT    Narrative *PRELIMINARY REPORT*    Comminuted, displaced, intra-articular fracture of the distal tibia. Comminuted,  displaced fracture of the distal fibula. Ankle mortise appears intact. Preliminary report was provided by Dr. Иавн Morgan, the on-call radiologist, at 11:56  PM.    Final report to follow. *ND PRELIMINARY REPORT*    *FINAL REPORT BELOW    EXAM:  CT LOW EXT RT WO CONT    INDICATION:  Right ankle pain and swelling after injury and fractures. COMPARISON: Right ankle views earlier the same day at 2252 hours    TECHNIQUE: Helical CT of the right ankle with coronal and sagittal reformats. Images reviewed in soft tissue and bone windows. CT dose reduction was achieved  through the use of a standardized protocol tailored for this examination and  automatic exposure control for dose modulation. CONTRAST: None. FINDINGS: Bones: Comminuted intra-articular fracture of the distal tibia is  predominantly oblique in the metaphysis.  Vertical fracture lines extend to the  tibial plafond. 2 mm maximum articular surface gap. No articular surface  depression. Comminuted fracture of the distal fibula is predominantly transverse and oblique  in the distal diaphysis. There is extension into the syndesmosis. No extension  into the epiphysis or articulation with the talus. Tarsal bones are intact. Joint fluid:  None. Articulations: Tibiotalar joint is intact. No significant underlying  osteoarthritis. Tendons: No full-thickness tendon tear. Muscles: No intramuscular hematoma. No focal atrophy. Soft tissue mass: None. Medial and lateral soft tissue swelling. No drainable  fluid collection. Impression IMPRESSION:   1. Comminuted intra-articular distal tibial fracture. 2 mm maximum articular  surface gap in the tibial plafond. No depression. 2. Comminuted distal fibular fracture extends into the syndesmosis. 3. Normal alignment of the ankle mortise. Results from [place] encounter on 01/25/17   CT ABD PELV W CONT    Narrative INDICATION: Acute mid upper abdominal pain, nausea, diarrhea    COMPARISON: None    TECHNIQUE:   Following the uneventful intravenous administration of 100 cc Isovue-370, thin  axial images were obtained through the abdomen and pelvis. Coronal and sagittal  reconstructions were generated. Oral contrast was not administered. CT dose  reduction was achieved through use of a standardized protocol tailored for this  examination and automatic exposure control for dose modulation. FINDINGS:   LUNG BASES: Clear. INCIDENTALLY IMAGED HEART AND MEDIASTINUM: Unremarkable. LIVER: No mass or biliary dilatation. GALLBLADDER: Unremarkable. SPLEEN: No mass. PANCREAS: No mass or ductal dilatation. ADRENALS: Unremarkable. KIDNEYS: Bilateral nonobstructing renal stones are noted, the largest of which  measures 5 mm. There is no ureteral stone or hydronephrosis. STOMACH: Unremarkable.   SMALL BOWEL: No dilatation or wall thickening. COLON: No dilatation or wall thickening. APPENDIX: Unremarkable. PERITONEUM: No ascites or pneumoperitoneum. RETROPERITONEUM: No lymphadenopathy or aortic aneurysm. REPRODUCTIVE ORGANS: Intrauterine device projects in satisfactory position. There is no pelvic mass or lymphadenopathy. URINARY BLADDER: No mass or calculus. BONES: No destructive bone lesion. ADDITIONAL COMMENTS: N/A      Impression IMPRESSION:  Bilateral nonobstructing renal stones. No acute abnormality. MRI Results (maximum last 3):  Per Dr Lior Hamilton note:  \". .. The patient also underwent MRI and MRA imaging at [facility] on 6/15/2020 interpreted by Dr. Quentin Islas. Those images are not available for direct review but the reports indicate  \"mild flattening of the pituitary gland to the sellar floor. Prominent bilateral optic nerve sheaths. Suggested symmetric narrowing of the distal transverse sinuses. Constellation of findings are nonspecific, however in a patient this age may represent sequela of idiopathic intracranial hypertension. \"     PET Results (maximum last 3): No results found for this or any previous visit. Assessment and Plan   Diagnoses and all orders for this visit:    1. Idiopathic intracranial hypertension  -     REFERRAL TO OPHTHALMOLOGY  -     acetaZOLAMIDE (DIAMOX) 250 mg tablet; Take 3 Tabs by mouth two (2) times a day. 2. Pulsatile tinnitus of right ear  -     REFERRAL TO OPHTHALMOLOGY  -     acetaZOLAMIDE (DIAMOX) 250 mg tablet; Take 3 Tabs by mouth two (2) times a day. 3. Intracranial vascular stenosis  -     acetaZOLAMIDE (DIAMOX) 250 mg tablet; Take 3 Tabs by mouth two (2) times a day. 4. Chronic migraine w/o aura w/o status migrainosus, not intractable  -     fremanezumab-vfrm (Ajovy Autoinjector) 225 mg/1.5 mL atIn; 1 Syringe by SubCUTAneous route now for 1 dose.   -     fremanezumab-vfrm (Ajovy Autoinjector) 225 mg/1.5 mL atIn; 1 Syringe by SubCUTAneous route every thirty (30) days.  -     THER/PROPH/DIAG INJECTION, SUBCUT/IM      42-year-old woman who has idiopathic increased intracranial hypertension based on opening pressure. She also has vascular stenosis contributing. Made a long conversation about the work-up done thus far. No need for repeat LP. She cannot have Topamax due to side effects. I think we need to try to optimize medical management as much as possible so I am going to titrate Diamox just slightly to see if we get any benefit. If that fails she is likely going to need follow-up with Dr. Marshall Chanel sooner than later for further discussion of stenting. I am going to also treat her for concomitant migraines as she has a history of migraines and sometimes patients can have superimposed symptoms on one another. I am going to have her start Ajovy monthly injections first dose now. I am sending her to Dr. Anton Fajardo for another opinion regarding the optic neuropathy versus edema. We talked about that the most serious complication of this condition is vision loss. This is not a life-threatening condition but can permanently impair her vision. If she has any double vision or anything distinctly different she will need to come to the emergency room possibly for emergent spinal tap after having imaging done. I reviewed and decided to continue the current medications. This clinical note was dictated with an electronic dictation software that can make unintentional errors. If there are any questions, please contact me directly for clarification. A notice of this visit/encounter being completed has been sent electronically to the patient's PCP and/or referring provider.      2 [facility] Neil Kimomate VIVIANA

## 2020-09-22 NOTE — LETTER
9/22/20 Patient: Alfredia Dance YOB: 1985 Date of Visit: 9/22/2020 Nabeel Gamez MD 
[address] [facility] [address] VIA Facsimile: [phone] Charlene Gunn MD 
[address] [facility] [place] [phone] [place] [address] VIA In Basket Dear MD Charlene Johnson MD, Thank you for referring Ms. Alfredia Dance to [address] [place] [address] for evaluation. My notes for this consultation are attached. If you have questions, please do not hesitate to call me. I look forward to following your patient along with you. Sincerely, [address] [place] [address], DO 
9/22/2020 Patient:  Alfredia Dance YOB: 1985 Date of Visit: 9/22/2020 Dear Nabeel Gamez MD 
[address] [facility] [address] VIA Facsimile: [phone] Charlene Gunn MD 
[address] [facility] [place] [phone] [place] [address] VIA In Basket: 
 
 
I was requested by Danny Claros MD to evaluate Ms. Alfredia Dance  for Chief Complaint Patient presents with  Neurologic Problem IIH, previously managed by Dr. Suha Rizvi Angry I am recommending the following:  
 
Diagnoses and all orders for this visit: 
 
1. Idiopathic intracranial hypertension 
-     REFERRAL TO OPHTHALMOLOGY 
-     acetaZOLAMIDE (DIAMOX) 250 mg tablet; Take 3 Tabs by mouth two (2) times a day. 2. Pulsatile tinnitus of right ear 
-     REFERRAL TO OPHTHALMOLOGY 
-     acetaZOLAMIDE (DIAMOX) 250 mg tablet; Take 3 Tabs by mouth two (2) times a day. 3. Intracranial vascular stenosis 
-     acetaZOLAMIDE (DIAMOX) 250 mg tablet; Take 3 Tabs by mouth two (2) times a day. 4. Chronic migraine w/o aura w/o status migrainosus, not intractable 
-     fremanezumab-vfrm (Ajovy Autoinjector) 225 mg/1.5 mL atIn; 1 Syringe by SubCUTAneous route now for 1 dose. -     fremanezumab-vfrm (Ajovy Autoinjector) 225 mg/1.5 mL atIn; 1 Syringe by SubCUTAneous route every thirty (30) days. -     THER/PROPH/DIAG INJECTION, SUBCUT/IM 
 
 
 
---------------------------------------------------------------------------------------------------------------------- Below is my encounter: Chief Complaint Patient presents with  Neurologic Problem IIH, previously managed by Dr. Elinor Leroy Referred by: PEPITO KAPADIA Long Pinzon is a 26-year-old woman referred to me for pseudotumor. She is here with her partner. She tells me around December 2019 she began to notice pulsatile tinnitus on the right and then of March of this year began to have increasing frequent headaches diffuse throbbing pounding pain with nausea and light sensitivity. She has a history of previous migraine headaches and this is different. Ultimately she had a spinal tap done with elevated pressure of 24.9. This was complicated by a CSF leak requiring a blood patch. She had been given a trial of topiramate but had significant side effects. Now she is on Diamox 500 twice daily experiencing paresthesias and taste changes. She saw Dr. Shikha Stephens recently and comments from his note are below. 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 She sees ophthalmology at [facility] but does not feel they have been very invested. She denies any current double vision. She feels tremulous throughout. A lot of anxiety on Wellbutrin and Xanax as needed. Per Dr. Shikha Stephens note: 
\". ..the previous records from Dr. Judith Delarosa dated 7/17/2020 (Dr. Lalit Fletcher). No optic disc edema was seen. This is apparently unchanged from baseline. Subsequent diagnostic testing suggested optic neuropathy. \" 
 
\". Heather Forbes At this point, without documentation of impending vision loss or refractory papilledema with maximum medical therapy, this patient is not eligible for venous sinus stenting. 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   This can often be seen in the setting of idiopathic intracranial hypertension and can even improve when pressures lower. It is also possible that this is the primary cause of her pseudotumor and if so she will likely fail medical therapy and require stenting for definitive treatment in the future. Her pulsatile tinnitus is likely caused by dominant right transverse sinus stenosis or compression and pseudotumor cerebri. Resolution of this symptom with light compression of the jugular vein is reassuring that no intracranial AV shunt is present. No cerebral angiogram is indicated for the pulsatile tinnitus at this time. \" Review of Systems Constitutional: Positive for malaise/fatigue. Eyes: Positive for blurred vision and photophobia. Negative for double vision. Gastrointestinal: Positive for nausea. Neurological: Positive for tremors and headaches. Psychiatric/Behavioral: The patient is nervous/anxious. All other systems reviewed and are negative. Past Medical History:  
Diagnosis Date  Anxiety disorder  Fatigue  Headache   
 H/O migraines  Heart abnormality   
 heart murmur, pt states. does not see cardiologist for this.  Heart murmur  N&V (nausea and vomiting)  Neuropathy  Polycystic disease, ovaries  Polycystic ovary syndrome  Ringing in ear, right  SOB (shortness of breath)  Thyroid disease  Vertigo Family History Problem Relation Age of Onset  Cancer Mother  Other Father   
     cerebral aneurysm  Other Maternal Grandfather Brain tumor  Hypertension Mother  Diabetes Mother  Stroke Father Social History Socioeconomic History  Marital status: SINGLE Spouse name: Not on file  Number of children: Not on file  Years of education: Not on file  Highest education level: Not on file Occupational History  Not on file Social Needs  Financial resource strain: Not on file  Food insecurity Worry: Not on file Inability: Not on file  Transportation needs Medical: Not on file Non-medical: Not on file Tobacco Use  Smoking status: Current Every Day Smoker  Smokeless tobacco: Never Used  Tobacco comment: vapes Substance and Sexual Activity  Alcohol use: Yes  Drug use: No  
 Sexual activity: Yes  
  Partners: Male Lifestyle  Physical activity Days per week: Not on file Minutes per session: Not on file  Stress: Not on file Relationships  Social connections Talks on phone: Not on file Gets together: Not on file Attends Yarsanism service: Not on file Active member of club or organization: Not on file Attends meetings of clubs or organizations: Not on file Relationship status: Not on file  Intimate partner violence Fear of current or ex partner: Not on file Emotionally abused: Not on file Physically abused: Not on file Forced sexual activity: Not on file Other Topics Concern  Not on file Social History Narrative ** Merged History Encounter **  
    
 
Current Outpatient Medications Medication Sig  
 simvastatin (ZOCOR) 20 mg tablet simvastatin 20 mg tablet  butalbital-acetaminophen-caffeine (FIORICET, ESGIC) -40 mg per tablet TAKE 1 TABLET BY MOUTH EVERY 4 HOURS AS NEEDED FOR HEADACHE  multivitamin (ONE A DAY) tablet Take 1 Tab by mouth daily.  fremanezumab-vfrm (Ajovy Autoinjector) 225 mg/1.5 mL atIn 1 Syringe by SubCUTAneous route now for 1 dose.  fremanezumab-vfrm (Ajovy Autoinjector) 225 mg/1.5 mL atIn 1 Syringe by SubCUTAneous route every thirty (30) days.  acetaZOLAMIDE (DIAMOX) 250 mg tablet Take 3 Tabs by mouth two (2) times a day.  aspirin delayed-release 81 mg tablet Take 2 Tabs by mouth daily. Indications: prevention for a blood clot going to the brain, rheumatoid arthritis, venous sinus stenosis  levothyroxine (SYNTHROID) 125 mcg tablet Take 0.125 mcg by mouth daily.  buPROPion XL (WELLBUTRIN XL) 300 mg XL tablet Take 300 mg by mouth daily.  meclizine (ANTIVERT) 25 mg tablet meclizine 25 mg tablet Take 1 tablet 3 times a day by oral route as needed.  ALPRAZolam (XANAX) 0.5 mg tablet Take 0.5 mg by mouth nightly.  cetirizine (ZyrTEC) 10 mg tablet Take 10 mg by mouth daily.  ondansetron (ZOFRAN ODT) 4 mg disintegrating tablet DISSOLVE 1 TABLET BY MOUTH TWICE A DAY AS NEEDED  
 INTRAUTERINE DEVICE, IUD, IU by IntraUTERine route. No current facility-administered medications for this visit. Allergies Allergen Reactions  Codeine Hives  Percocet [Oxycodone-Acetaminophen] Hives  Codeine Nausea and Vomiting  Percocet [Oxycodone-Acetaminophen] Nausea and Vomiting Neurologic Exam  
 
Mental Status Oriented to person, place, and time. Cranial Nerves Cranial nerves II through XII intact. Optic disc edema not clearly seen, no motility deficits Motor Exam  
Muscle bulk: normal 
 
Strength Strength 5/5 throughout. Sensory Exam  
Light touch normal.  
 
Gait, Coordination, and Reflexes Gait Gait: normal 
 
Coordination Finger to nose coordination: normal 
 
Tremor Resting tremor: absent Reflexes Right brachioradialis: 3+ Left brachioradialis: 3+ Right biceps: 3+ Left biceps: 3+ Right patellar: 3+ Left patellar: 3+ Right achilles: 2+ Left achilles: 2+ Physical Exam  
Constitutional: She is oriented to person, place, and time. She appears well-developed and well-nourished. Cardiovascular: Normal rate. Pulmonary/Chest: Effort normal.  
Neurological: She is oriented to person, place, and time. She has normal strength. She has a normal Finger-Nose-Finger Test. Gait normal.  
Reflex Scores: 
     Bicep reflexes are 3+ on the right side and 3+ on the left side.  
     Brachioradialis reflexes are 3+ on the right side and 3+ on the left side. 
     Patellar reflexes are 3+ on the right side and 3+ on the left side. Achilles reflexes are 2+ on the right side and 2+ on the left side. Skin: Skin is warm and dry. Psychiatric: Her behavior is normal.  
Vitals reviewed. Visit Vitals BP (!) 140/84 (BP 1 Location: Right arm, BP Patient Position: Sitting) Pulse 90 Ht 5' 3\" (1.6 m) Wt 81.2 kg (179 lb) LMP 09/01/2020 (Approximate) SpO2 98% BMI 31.71 kg/m² Lab Results Component Value Date/Time WBC 10.4 01/25/2017 08:42 AM  
 HGB 12.3 01/25/2017 08:42 AM  
 HCT 38.4 01/25/2017 08:42 AM  
 PLATELET 344 73/09/5498 08:42 AM  
 MCV 87.3 01/25/2017 08:42 AM  
 
Lab Results Component Value Date/Time Glucose 115 (H) 01/25/2017 08:42 AM  
 Creatinine 0.82 01/25/2017 08:42 AM  
  
No results found for: CHOL, CHOLPOCT, HDL, LDL, LDLC, LDLCPOC, LDLCEXT, TRIGL, TGLPOCT, CHHD, CHHDX Lab Results Component Value Date/Time ALT (SGPT) 24 01/25/2017 08:42 AM  
 Alk. phosphatase 64 01/25/2017 08:42 AM  
 Bilirubin, total 0.3 01/25/2017 08:42 AM  
 Albumin 4.2 01/25/2017 08:42 AM  
 Protein, total 7.9 01/25/2017 08:42 AM  
 PLATELET 822 28/41/3414 08:42 AM  
  
 
CT Results (maximum last 3): Results from Hospital Encounter encounter on 06/11/18 CT LOW EXT RT WO CONT Narrative *PRELIMINARY REPORT* Comminuted, displaced, intra-articular fracture of the distal tibia. Comminuted, 
displaced fracture of the distal fibula. Ankle mortise appears intact. Preliminary report was provided by Dr. Barbie Singh, the on-call radiologist, at 11:56 PM. Final report to follow. *ND PRELIMINARY REPORT* *FINAL REPORT BELOW EXAM:  CT LOW EXT RT WO CONT INDICATION:  Right ankle pain and swelling after injury and fractures. COMPARISON: Right ankle views earlier the same day at 2252 hours TECHNIQUE: Helical CT of the right ankle with coronal and sagittal reformats. Images reviewed in soft tissue and bone windows. CT dose reduction was achieved 
through the use of a standardized protocol tailored for this examination and 
automatic exposure control for dose modulation. CONTRAST: None. FINDINGS: Bones: Comminuted intra-articular fracture of the distal tibia is 
predominantly oblique in the metaphysis. Vertical fracture lines extend to the 
tibial plafond. 2 mm maximum articular surface gap. No articular surface 
depression. Comminuted fracture of the distal fibula is predominantly transverse and oblique 
in the distal diaphysis. There is extension into the syndesmosis. No extension 
into the epiphysis or articulation with the talus. Tarsal bones are intact. Joint fluid:  None. Articulations: Tibiotalar joint is intact. No significant underlying 
osteoarthritis. Tendons: No full-thickness tendon tear. Muscles: No intramuscular hematoma. No focal atrophy. Soft tissue mass: None. Medial and lateral soft tissue swelling. No drainable 
fluid collection. Impression IMPRESSION:  
1. Comminuted intra-articular distal tibial fracture. 2 mm maximum articular 
surface gap in the tibial plafond. No depression. 2. Comminuted distal fibular fracture extends into the syndesmosis. 3. Normal alignment of the ankle mortise. Results from Hospital Encounter encounter on 01/25/17 CT ABD PELV W CONT Narrative INDICATION: Acute mid upper abdominal pain, nausea, diarrhea COMPARISON: None TECHNIQUE:  
Following the uneventful intravenous administration of 100 cc Isovue-370, thin 
axial images were obtained through the abdomen and pelvis. Coronal and sagittal 
reconstructions were generated. Oral contrast was not administered. CT dose 
reduction was achieved through use of a standardized protocol tailored for this 
examination and automatic exposure control for dose modulation. FINDINGS:  
LUNG BASES: Clear. INCIDENTALLY IMAGED HEART AND MEDIASTINUM: Unremarkable. LIVER: No mass or biliary dilatation. GALLBLADDER: Unremarkable. SPLEEN: No mass. PANCREAS: No mass or ductal dilatation. ADRENALS: Unremarkable. KIDNEYS: Bilateral nonobstructing renal stones are noted, the largest of which 
measures 5 mm. There is no ureteral stone or hydronephrosis. STOMACH: Unremarkable. SMALL BOWEL: No dilatation or wall thickening. COLON: No dilatation or wall thickening. APPENDIX: Unremarkable. PERITONEUM: No ascites or pneumoperitoneum. RETROPERITONEUM: No lymphadenopathy or aortic aneurysm. REPRODUCTIVE ORGANS: Intrauterine device projects in satisfactory position. There is no pelvic mass or lymphadenopathy. URINARY BLADDER: No mass or calculus. BONES: No destructive bone lesion. ADDITIONAL COMMENTS: N/A Impression IMPRESSION: 
Bilateral nonobstructing renal stones. No acute abnormality. MRI Results (maximum last 3): Per Dr Jan Segovia note: 
\". .. [facility] interpreted by Dr. Espinoza Winslow. Those images are not available for direct review but the reports indicate  \"mild flattening of the pituitary gland to the sellar floor. Prominent bilateral optic nerve sheaths. Suggested symmetric narrowing of the distal transverse sinuses. Constellation of findings are nonspecific, however in a patient this age may represent sequela of idiopathic intracranial hypertension. \" Assessment and Plan 1. Idiopathic intracranial hypertension 
-     REFERRAL TO OPHTHALMOLOGY 
-     acetaZOLAMIDE (DIAMOX) 250 mg tablet; Take 3 Tabs by mouth two (2) times a day. 2. Pulsatile tinnitus of right ear 
-     REFERRAL TO OPHTHALMOLOGY 
-     acetaZOLAMIDE (DIAMOX) 250 mg tablet; Take 3 Tabs by mouth two (2) times a day. 3. Intracranial vascular stenosis 
-     acetaZOLAMIDE (DIAMOX) 250 mg tablet; Take 3 Tabs by mouth two (2) times a day. 4. Chronic migraine w/o aura w/o status migrainosus, not intractable 
-     fremanezumab-vfrm (Ajovy Autoinjector) 225 mg/1.5 mL atIn; 1 Syringe by SubCUTAneous route now for 1 dose. -     fremanezumab-vfrm (Ajovy Autoinjector) 225 mg/1.5 mL atIn; 1 Syringe by SubCUTAneous route every thirty (30) days. 
-     THER/PROPH/DIAG INJECTION, SUBCUT/IM 
 
 
39-year-old woman who has idiopathic increased intracranial hypertension based on opening pressure. She also has vascular stenosis contributing. Made a long conversation about the work-up done thus far. No need for repeat LP. She cannot have Topamax due to side effects. I think we need to try to optimize medical management as much as possible so I am going to titrate Diamox just slightly to see if we get any benefit. If that fails she is likely going to need follow-up with Dr. Osorio Sheppard sooner than later for further discussion of stenting. I am going to also treat her for concomitant migraines as she has a history of migraines and sometimes patients can have superimposed symptoms on one another. I am going to have her start Ajovy monthly injections first dose now. I am sending her to Dr. Sharifa Manuel for another opinion regarding the optic neuropathy versus edema. We talked about that the most serious complication of this condition is vision loss. This is not a life-threatening condition but can permanently impair her vision. If she has any double vision or anything distinctly different she will need to come to the emergency room possibly for emergent spinal tap after having imaging done. I reviewed and decided to continue the current medications. This clinical note was dictated with an electronic dictation software that can make unintentional errors. If there are any questions, please contact me directly for clarification.  
 
Thank you for giving me the opportunity to assist in the care of Ms. Jillian Robert. If you have questions, please do not hesitate to contact me. Sincerely, [address] [facility], DO Neurologist 
Brain Injury Medicine Diplomate ABPN

## 2020-09-22 NOTE — PROGRESS NOTES
Chief Complaint   Patient presents with    Neurologic Problem     IIH, previously managed by Dr. Argenis Aguayo  BP (!) 140/84 (BP 1 Location: Right arm, BP Patient Position: Sitting)   Pulse 90   Ht 5' 3\" (1.6 m)   Wt 81.2 kg (179 lb)   SpO2 98%   BMI 31.71 kg/m²

## 2020-09-30 ENCOUNTER — DOCUMENTATION ONLY (OUTPATIENT)
Dept: NEUROLOGY | Facility: CLINIC | Age: 35
End: 2020-09-30

## 2020-09-30 ENCOUNTER — TELEPHONE (OUTPATIENT)
Dept: NEUROLOGY | Facility: CLINIC | Age: 35
End: 2020-09-30

## 2020-09-30 NOTE — PROGRESS NOTES
Please let her know that I saw the evaluation from ophthalmology. They indicate there is no obvious swelling of the optic nerve which is reassuring but there are progressive changes on visual field testing that is concerning. They are recommending that she follow-up with Dr. Jessica Beltran sooner than 6 months and I would agree with that. It looks like they also referred her to a neuro-ophthalmologist which is also appropriate. Hopefully the Ajovy is helping to some degree. We will see her in follow-up.

## 2020-09-30 NOTE — PROGRESS NOTES
Patient called, verified and notified. She stated she will touch base with us after follow up with Dr. Jamila Lara and appointment with neuro ophthamology.

## 2020-10-07 ENCOUNTER — OFFICE VISIT (OUTPATIENT)
Dept: NEUROSURGERY | Age: 35
End: 2020-10-07
Payer: COMMERCIAL

## 2020-10-07 VITALS
OXYGEN SATURATION: 98 % | HEIGHT: 63 IN | SYSTOLIC BLOOD PRESSURE: 110 MMHG | TEMPERATURE: 98.2 F | WEIGHT: 173 LBS | BODY MASS INDEX: 30.65 KG/M2 | DIASTOLIC BLOOD PRESSURE: 76 MMHG | HEART RATE: 70 BPM

## 2020-10-07 DIAGNOSIS — G93.2 IDIOPATHIC INTRACRANIAL HYPERTENSION: Primary | ICD-10-CM

## 2020-10-07 DIAGNOSIS — R51.9 CHRONIC RIGHT-SIDED HEADACHE: ICD-10-CM

## 2020-10-07 DIAGNOSIS — E66.9 OBESITY (BMI 30.0-34.9): ICD-10-CM

## 2020-10-07 DIAGNOSIS — G89.29 CHRONIC RIGHT-SIDED HEADACHE: ICD-10-CM

## 2020-10-07 DIAGNOSIS — H93.A1 PULSATILE TINNITUS OF RIGHT EAR: ICD-10-CM

## 2020-10-07 PROCEDURE — 99215 OFFICE O/P EST HI 40 MIN: CPT | Performed by: RADIOLOGY

## 2020-10-07 NOTE — PATIENT INSTRUCTIONS
A Healthy Lifestyle: Care Instructions Your Care Instructions A healthy lifestyle can help you feel good, stay at a healthy weight, and have plenty of energy for both work and play. A healthy lifestyle is something you can share with your whole family. A healthy lifestyle also can lower your risk for serious health problems, such as high blood pressure, heart disease, and diabetes. You can follow a few steps listed below to improve your health and the health of your family. Follow-up care is a key part of your treatment and safety. Be sure to make and go to all appointments, and call your doctor if you are having problems. It's also a good idea to know your test results and keep a list of the medicines you take. How can you care for yourself at home? · Do not eat too much sugar, fat, or fast foods. You can still have dessert and treats now and then. The goal is moderation. · Start small to improve your eating habits. Pay attention to portion sizes, drink less juice and soda pop, and eat more fruits and vegetables. ? Eat a healthy amount of food. A 3-ounce serving of meat, for example, is about the size of a deck of cards. Fill the rest of your plate with vegetables and whole grains. ? Limit the amount of soda and sports drinks you have every day. Drink more water when you are thirsty. ? Eat at least 5 servings of fruits and vegetables every day. It may seem like a lot, but it is not hard to reach this goal. A serving or helping is 1 piece of fruit, 1 cup of vegetables, or 2 cups of leafy, raw vegetables. Have an apple or some carrot sticks as an afternoon snack instead of a candy bar. Try to have fruits and/or vegetables at every meal. 
· Make exercise part of your daily routine. You may want to start with simple activities, such as walking, bicycling, or slow swimming. Try to be active 30 to 60 minutes every day.  You do not need to do all 30 to 60 minutes all at once. For example, you can exercise 3 times a day for 10 or 20 minutes. Moderate exercise is safe for most people, but it is always a good idea to talk to your doctor before starting an exercise program. 
· Keep moving. [place] the lawn, work in the garden, or [place]. Take the stairs instead of the elevator at work. · If you smoke, quit. People who smoke have an increased risk for heart attack, stroke, cancer, and other lung illnesses. Quitting is hard, but there are ways to boost your chance of quitting tobacco for good. ? Use nicotine gum, patches, or lozenges. ? Ask your doctor about stop-smoking programs and medicines. ? Keep trying. In addition to reducing your risk of diseases in the future, you will notice some benefits soon after you stop using tobacco. If you have shortness of breath or asthma symptoms, they will likely get better within a few weeks after you quit. · Limit how much alcohol you drink. Moderate amounts of alcohol (up to 2 drinks a day for men, 1 drink a day for women) are okay. But drinking too much can lead to liver problems, high blood pressure, and other health problems. Family health If you have a family, there are many things you can do together to improve your health. · Eat meals together as a family as often as possible. · Eat healthy foods. This includes fruits, vegetables, lean meats and dairy, and whole grains. · Include your family in your fitness plan. Most people think of activities such as jogging or tennis as the way to fitness, but there are many ways you and your family can be more active. Anything that makes you breathe hard and gets your heart pumping is exercise. Here are some tips: 
? Walk to do errands or to take your child to school or the bus. 
? Go for a family bike ride after dinner instead of watching TV. Where can you learn more? Go to http://www.[employer]/ Enter L781 in the search box to learn more about \"A Healthy Lifestyle: Care Instructions. \" Current as of: January 31, 2020               Content Version: 12.6 © 6779-1252 [employer]. Care instructions adapted under license by [employer] (which disclaims liability or warranty for this information). If you have questions about a medical condition or this instruction, always ask your healthcare professional. [place] [address] any warranty or liability for your use of this information.

## 2020-10-08 ENCOUNTER — TELEPHONE (OUTPATIENT)
Dept: NEUROSURGERY | Age: 35
End: 2020-10-08

## 2020-10-08 NOTE — TELEPHONE ENCOUNTER
Spoke to patient to confirm Diagnostic angiogram on 11/6/2020. 7:00 am arrival time. Patient stated understanding.

## 2020-10-13 NOTE — H&P (VIEW-ONLY)
Neurointerventional Surgery Established Outpatient Visit (and H&P) Patient: Anna Person MRN: [id number]  SSN: [id number] YOB: 1985  Age: 38 y.o. Sex: female Subjective:  
  
Anna Person returns today for follow-up for intracranial hypertension and bilateral transverse cerebral venous sinus stenosis. I previously saw this patient on 4/24/2020 and 9/8/2020 (see previous notes). Since her last visit, Pantera Llamas NP (neurology) has increased her Diamox dose to 750 mg twice daily. Since the dose change, the patient reports hand pain and \"puffiness\" she is still missing approximately 1 day of work per week due to intractable headaches. She is also complaining of \"blurred vision\" and right-sided pulsatile tinnitus. She recently was evaluated by Dr. Maripoas Manning at the [facility] who noted no pallor or obvious edema in her optic disc but did document an optic neuropathy and recommended that the patient see \"Dr. Ez Ching for neuro/OP with repeat AVF\". Past Medical History:  
Diagnosis Date  Anxiety disorder  Fatigue  Headache   
 H/O migraines  Heart abnormality   
 heart murmur, pt states. does not see cardiologist for this.  Heart murmur  N&V (nausea and vomiting)  Neuropathy  Polycystic disease, ovaries  Polycystic ovary syndrome  Ringing in ear, right  SOB (shortness of breath)  Thyroid disease  Vertigo Past Surgical History:  
Procedure Laterality Date  HX CARPAL TUNNEL RELEASE  HX ORTHOPAEDIC Family History Problem Relation Age of Onset  Cancer Mother  Other Father   
     cerebral aneurysm  Other Maternal Grandfather Brain tumor  Hypertension Mother  Diabetes Mother  Stroke Father Social History Tobacco Use  Smoking status: Current Every Day Smoker  Smokeless tobacco: Never Used  Tobacco comment: vapes Substance Use Topics  Alcohol use: Yes Current Outpatient Medications Medication Sig Dispense Refill  butalbital-acetaminophen-caffeine (FIORICET, ESGIC) -40 mg per tablet TAKE 1 TABLET BY MOUTH EVERY 4 HOURS AS NEEDED FOR HEADACHE  multivitamin (ONE A DAY) tablet Take 1 Tab by mouth daily.  fremanezumab-vfrm (Ajovy Autoinjector) 225 mg/1.5 mL atIn 1 Syringe by SubCUTAneous route every thirty (30) days. 1 Syringe 5  
 acetaZOLAMIDE (DIAMOX) 250 mg tablet Take 3 Tabs by mouth two (2) times a day. 180 Tab 3  
 aspirin delayed-release 81 mg tablet Take 2 Tabs by mouth daily. Indications: prevention for a blood clot going to the brain, rheumatoid arthritis, venous sinus stenosis 60 Tab 5  levothyroxine (SYNTHROID) 125 mcg tablet Take 0.125 mcg by mouth daily.  buPROPion XL (WELLBUTRIN XL) 300 mg XL tablet Take 300 mg by mouth daily.  meclizine (ANTIVERT) 25 mg tablet meclizine 25 mg tablet Take 1 tablet 3 times a day by oral route as needed.  ondansetron (ZOFRAN ODT) 4 mg disintegrating tablet DISSOLVE 1 TABLET BY MOUTH TWICE A DAY AS NEEDED  ALPRAZolam (XANAX) 0.5 mg tablet Take 0.5 mg by mouth nightly.  cetirizine (ZyrTEC) 10 mg tablet Take 10 mg by mouth daily.  simvastatin (ZOCOR) 20 mg tablet simvastatin 20 mg tablet  INTRAUTERINE DEVICE, IUD, IU by IntraUTERine route. Allergies Allergen Reactions  Codeine Hives  Percocet [Oxycodone-Acetaminophen] Hives  Codeine Nausea and Vomiting  Percocet [Oxycodone-Acetaminophen] Nausea and Vomiting Review of Systems: A comprehensive review of systems was negative except for that written in the History of Present Illness. Objective:  
 
Vitals:  
 10/07/20 1529 BP: 110/76 Pulse: 70 Temp: 98.2 °F (36.8 °C) TempSrc: Temporal  
SpO2: 98% Weight: 173 lb (78.5 kg) Height: 5' 3\" (1.6 m) Physical Exam: 
GENERAL: alert, cooperative, no distress, appears stated age THROAT & NECK: normal and no erythema or exudates noted. LUNG: clear to auscultation bilaterally HEART: regular rate and rhythm, S1, S2 normal, no murmur, click, rub or gallop Neurologic Exam: 
Mental Status:  Alert and oriented x 4. Appropriate affect, mood and behavior. Language:    Normal fluency, repetition, comprehension and naming. Cranial Nerves:   Pupils equal, round and reactive to light. Visual fields full to confrontation. Extraocular movements intact. Facial sensation intact V1 - V3. Full facial strength, no asymmetry. Hearing intact bilaterally. No dysarthria. Tongue protrudes to midline, palate elevates symmetrically. Shoulder shrug 5/5 bilaterally. Motor:    No pronator drift. Bulk and tone normal.  
   5/5 power in all extremities proximally and distally. No involuntary movements. Sensation:    Sensation intact throughout to light touch. There is no neglect. Romberg is negative. Coordination & Gait: Normal.  Normal tandem gait. Normal rapid alternating movements and finger-to-nose. Imaging: 
 
Retinal imaging was sent over by [facility] scanned into the system. I am not qualified to interpret these images and have a call out to Dr. Soraya Wilson for direction. No new diagnostic imaging was reviewed for today's visit. Images from an outside hospital CTA performed on February 17 2020. The right transverse sigmoid sinus is dominant with severe stenosis at the junction. The nondominant left transverse sigmoid sinus is barely visible along the midportion and there is no visible opacification at the junction with the sigmoid segment. 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 Assessment and Plan: Today the patient and I along with a family member had a long conversation about her ongoing symptoms of intracranial hypertension. Previous lumbar punctures of demonstrated opening pressures of 2425. From my interpretation of the notes from [address] [place], she does not currently have papilledema but there is an optic neuropathy that may have progressed since her last visit. I have a message out to Dr. Miguel Angel Rodriguez at [facility] to discuss these findings and the retinal imaging that was sent over by the [facility]. At a minimum, this patient does not appear to be improving clinically on significant doses of Diamox (750 mg twice daily for the past 3 weeks) and modest weight loss. Her ongoing right-sided pulsatile tinnitus is also a concern and given the findings of bilateral transverse venous sinus stenosis, I think there is at least an indication to proceed with diagnostic cerebral angiography to definitively characterize the cerebral venous sinus stenoses and to evaluate for dural AV fistula which can contribute to this type of clinical picture. If present, a dural AV fistula would also carry a significant risk of intracranial hemorrhage. Noninvasive imaging findings are consistent with intracranial hypertension and venous sinus pressure measurements will also be conducted in the upcoming diagnostic study at [facility]. 
 
At this point, the patient is expressing a very strong desire to proceed with venous sinus stenting. Without the additional diagnostic cerebral angiography and venous sinus pressure gradient measurements, I cannot say at this point whether she would qualify for the stent (based on cerebral venous sinus manometry) but symptomatically she is not responding well to medical therapy at this point. Today we had another extensive discussion about the risk, benefits and alternatives of endovascular therapy for pseudotumor cerebri.   We also discussed the alternative of continued weight loss as an effective treatment for this disorder and the possibility of long-term treatment failure due to restenosis of the transverse sinuses without the weight loss. The patient expressed understanding. We discussed the risks, benefits and alternatives to cerebral angiography and cerebral venous sinus pressure gradient measurements in detail today. Diagrams of the procedure and potential complications were reviewed. Specific risks discussed including ischemic stroke with permanent neurological deficit, intracranial hemorrhage, vision loss, vascular injury, bleeding infection at the groin puncture sites, limb ischemia, closure device failure, and adverse reactions to medications or contrast agents. The patient was given an opportunity to ask questions and all of them were answered. Written informed consent was obtained in the office. We also discussed some of the risks and benefits of cerebral venous sinus stenting although stenting is not planned at this time. The patient understands that the primary goal of cerebral venous sinus stenting is preservation of vision. We also discussed the fact that her other constitutional symptoms may not improve and could worsen after a stenting procedure, especially headaches. She was also require dual antiplatelet therapy for 3 months after the procedure. I offered her image guided lumbar puncture to remove CSF today but she declined. We will plan on proceeding with diagnostic cerebral angiography and awake cerebral venous sinus pressure gradient measurements (MAC anesthesia) at [facility] in the near future. I am currently awaiting a callback from Dr. Miguel Downing in ophthalmology at [facility]. It is possible that we could alter or delay the diagnostic procedure based on his recommendations. Thank you for this consult and participating in the care of this patient. I have discussed the diagnosis with the patient and the intended plan as seen in the above orders. Patient is in agreement. Signed By: Tammie Jimenez MD   
 October 13, 2020

## 2020-10-16 ENCOUNTER — DOCUMENTATION ONLY (OUTPATIENT)
Dept: NEUROSURGERY | Age: 35
End: 2020-10-16

## 2020-10-16 NOTE — PROGRESS NOTES
I was able to speak to Dr. Amy Bailey at [facility] today at [facility] [place] [facility].  Per Dr. 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 He has recommended neuro-ophtho evaluation for a sub specialized opinion. The presentation is atypical given the lack of optic disc edema. However, intracranial hypertension is the only current risk factor that this patient has. We will proceed with diagnostics but I would wait to stent until [facility]

## 2020-10-27 DIAGNOSIS — H93.A9 PULSATILE TINNITUS: Primary | ICD-10-CM

## 2020-10-28 ENCOUNTER — TELEPHONE (OUTPATIENT)
Dept: NEUROSURGERY | Age: 35
End: 2020-10-28

## 2020-10-29 ENCOUNTER — TELEPHONE (OUTPATIENT)
Dept: NEUROSURGERY | Age: 35
End: 2020-10-29

## 2020-10-29 NOTE — TELEPHONE ENCOUNTER
Contacted pt about upcoming diagnostic cerebral angiogram. Pt stating she had labs done last week at her PCP office, will fax us those results. Pt informed that she may need to obtain additional labs depending on what tests her PCP collected. Pt verbalized understanding and stated she could get additional labs tomorrow if needed. Will call pt back if more labs needed once we receive fax from PCP office. Also discussed with pt: NPO at 0000 prior to procedure at to take 650 mg ASA the night before.

## 2020-11-03 LAB
ERYTHROCYTE [DISTWIDTH] IN BLOOD BY AUTOMATED COUNT: 11.6 % (ref 11.7–15.4)
HCT VFR BLD AUTO: 40.1 % (ref 34–46.6)
HGB BLD-MCNC: 13.1 G/DL (ref 11.1–15.9)
MCH RBC QN AUTO: 28.8 PG (ref 26.6–33)
MCHC RBC AUTO-ENTMCNC: 32.7 G/DL (ref 31.5–35.7)
MCV RBC AUTO: 88 FL (ref 79–97)
PLATELET # BLD AUTO: 310 X10E3/UL (ref 150–450)
RBC # BLD AUTO: 4.55 X10E6/UL (ref 3.77–5.28)
WBC # BLD AUTO: 7 X10E3/UL (ref 3.4–10.8)

## 2020-11-04 ENCOUNTER — DOCUMENTATION ONLY (OUTPATIENT)
Dept: NEUROSURGERY | Age: 35
End: 2020-11-04

## 2020-11-04 NOTE — PROGRESS NOTES
[facility] 1st case  Cerebral angiogram + venous sinus pressure measurements    MAC anesthesia   No A line necessary, but I will need an A line setup (from nursing) to connect to the catheter for venous sinus pressure measurement  No belcher  Confirm aspirin the night before    Injector 70-80% contrast dilution please  Bilateral groin puncture, 5F x2   5F vert - for diagnostic arteriogram  XT 27, Fathom wire for venous sinuses    Estimated time on table 90 minutes    2 hour recovery then home    [facility]

## 2020-11-04 NOTE — PROGRESS NOTES
I received office notes from [facility] (Dr. Cara Armstrong) dated 10/30/2020 stating that there are \"overall, no concerning abnormalities \". Thyroid is showing it is receiving too much supplementation so levothyroxine was decreased to 112 mcg daily with a plan to recheck in 3 months. A mild elevation in creatinine was also noted, likely attributable to Diamox. We were previously aware of this finding. Okay to proceed with procedure on Friday.

## 2020-11-05 ENCOUNTER — TELEPHONE (OUTPATIENT)
Dept: NEUROSURGERY | Age: 35
End: 2020-11-05

## 2020-11-05 ENCOUNTER — ANESTHESIA EVENT (OUTPATIENT)
Dept: INTERVENTIONAL RADIOLOGY/VASCULAR | Age: 35
End: 2020-11-05
Payer: COMMERCIAL

## 2020-11-05 NOTE — TELEPHONE ENCOUNTER
Spoke to patient to confirm angiogram for tomorrow 11/5/2020 with a 7:00 am arrival time at [facility]. Informed patient to take 650 mg aspirin tonight, no eating or drinking after midnight and take morning medications with sips of water. Patient stated understanding.

## 2020-11-06 ENCOUNTER — ANESTHESIA (OUTPATIENT)
Dept: INTERVENTIONAL RADIOLOGY/VASCULAR | Age: 35
End: 2020-11-06
Payer: COMMERCIAL

## 2020-11-06 ENCOUNTER — HOSPITAL ENCOUNTER (OUTPATIENT)
Dept: INTERVENTIONAL RADIOLOGY/VASCULAR | Age: 35
Discharge: HOME | End: 2020-11-06
Attending: RADIOLOGY | Admitting: RADIOLOGY
Payer: COMMERCIAL

## 2020-11-06 VITALS
DIASTOLIC BLOOD PRESSURE: 67 MMHG | TEMPERATURE: 97.9 F | OXYGEN SATURATION: 100 % | BODY MASS INDEX: 29.59 KG/M2 | SYSTOLIC BLOOD PRESSURE: 110 MMHG | HEIGHT: 63 IN | RESPIRATION RATE: 16 BRPM | HEART RATE: 65 BPM | WEIGHT: 167 LBS

## 2020-11-06 DIAGNOSIS — H93.A9 PULSATILE TINNITUS: ICD-10-CM

## 2020-11-06 PROCEDURE — C1887 CATHETER, GUIDING: HCPCS

## 2020-11-06 PROCEDURE — 76060000034 HC ANESTHESIA 1.5 TO 2 HR

## 2020-11-06 PROCEDURE — 77030021532 HC CATH ANGI DX IMPRS MRTM -B

## 2020-11-06 PROCEDURE — C1894 INTRO/SHEATH, NON-LASER: HCPCS

## 2020-11-06 PROCEDURE — 77030008584 HC TOOL GDWRE DEV TERU -A

## 2020-11-06 PROCEDURE — 77030008638 HC TU CONN COOK -A

## 2020-11-06 PROCEDURE — C1769 GUIDE WIRE: HCPCS

## 2020-11-06 PROCEDURE — 36218 PLACE CATHETER IN ARTERY: CPT

## 2020-11-06 PROCEDURE — 75860 VEIN X-RAY NECK: CPT

## 2020-11-06 PROCEDURE — 74011000250 HC RX REV CODE- 250: Performed by: RADIOLOGY

## 2020-11-06 PROCEDURE — 74011250636 HC RX REV CODE- 250/636: Performed by: RADIOLOGY

## 2020-11-06 PROCEDURE — 2709999900 HC NON-CHARGEABLE SUPPLY

## 2020-11-06 PROCEDURE — 76937 US GUIDE VASCULAR ACCESS: CPT | Performed by: RADIOLOGY

## 2020-11-06 PROCEDURE — 77030003394 HC NDL ART COOK -A

## 2020-11-06 PROCEDURE — C1760 CLOSURE DEV, VASC: HCPCS

## 2020-11-06 PROCEDURE — 36227 PLACE CATH XTRNL CAROTID: CPT | Performed by: RADIOLOGY

## 2020-11-06 PROCEDURE — 74011000636 HC RX REV CODE- 636: Performed by: RADIOLOGY

## 2020-11-06 PROCEDURE — 36226 PLACE CATH VERTEBRAL ART: CPT | Performed by: RADIOLOGY

## 2020-11-06 PROCEDURE — 77030012468 HC VLV BLEEDBK CNTRL ABBT -B

## 2020-11-06 PROCEDURE — 36224 PLACE CATH CAROTD ART: CPT | Performed by: RADIOLOGY

## 2020-11-06 RX ORDER — GLYCOPYRROLATE 0.2 MG/ML
INJECTION INTRAMUSCULAR; INTRAVENOUS AS NEEDED
Status: DISCONTINUED | OUTPATIENT
Start: 2020-11-06 | End: 2020-11-06 | Stop reason: HOSPADM

## 2020-11-06 RX ORDER — FENTANYL CITRATE 50 UG/ML
INJECTION, SOLUTION INTRAMUSCULAR; INTRAVENOUS AS NEEDED
Status: DISCONTINUED | OUTPATIENT
Start: 2020-11-06 | End: 2020-11-06 | Stop reason: HOSPADM

## 2020-11-06 RX ORDER — LIDOCAINE HYDROCHLORIDE 20 MG/ML
20 INJECTION, SOLUTION INFILTRATION; PERINEURAL ONCE
Status: COMPLETED | OUTPATIENT
Start: 2020-11-06 | End: 2020-11-06

## 2020-11-06 RX ORDER — MIDAZOLAM HYDROCHLORIDE 1 MG/ML
INJECTION, SOLUTION INTRAMUSCULAR; INTRAVENOUS AS NEEDED
Status: DISCONTINUED | OUTPATIENT
Start: 2020-11-06 | End: 2020-11-06 | Stop reason: HOSPADM

## 2020-11-06 RX ORDER — ONDANSETRON 2 MG/ML
INJECTION INTRAMUSCULAR; INTRAVENOUS AS NEEDED
Status: DISCONTINUED | OUTPATIENT
Start: 2020-11-06 | End: 2020-11-06 | Stop reason: HOSPADM

## 2020-11-06 RX ORDER — SODIUM CHLORIDE, SODIUM LACTATE, POTASSIUM CHLORIDE, CALCIUM CHLORIDE 600; 310; 30; 20 MG/100ML; MG/100ML; MG/100ML; MG/100ML
INJECTION, SOLUTION INTRAVENOUS
Status: DISCONTINUED | OUTPATIENT
Start: 2020-11-06 | End: 2020-11-06 | Stop reason: HOSPADM

## 2020-11-06 RX ORDER — HEPARIN SODIUM 1000 [USP'U]/ML
INJECTION, SOLUTION INTRAVENOUS; SUBCUTANEOUS AS NEEDED
Status: DISCONTINUED | OUTPATIENT
Start: 2020-11-06 | End: 2020-11-06 | Stop reason: HOSPADM

## 2020-11-06 RX ADMIN — ONDANSETRON 4 MG: 2 INJECTION INTRAMUSCULAR; INTRAVENOUS at 08:22

## 2020-11-06 RX ADMIN — HEPARIN SODIUM 20000 UNITS: 1000 INJECTION INTRAVENOUS; SUBCUTANEOUS at 08:21

## 2020-11-06 RX ADMIN — FENTANYL CITRATE 25 MCG: 50 INJECTION, SOLUTION INTRAMUSCULAR; INTRAVENOUS at 08:13

## 2020-11-06 RX ADMIN — LIDOCAINE HYDROCHLORIDE 15 MG: 20 INJECTION, SOLUTION INFILTRATION; PERINEURAL at 08:36

## 2020-11-06 RX ADMIN — FENTANYL CITRATE 25 MCG: 50 INJECTION, SOLUTION INTRAMUSCULAR; INTRAVENOUS at 09:16

## 2020-11-06 RX ADMIN — GLYCOPYRROLATE 0.2 MG: 0.2 INJECTION INTRAMUSCULAR; INTRAVENOUS at 09:19

## 2020-11-06 RX ADMIN — MIDAZOLAM HYDROCHLORIDE 2 MG: 1 INJECTION, SOLUTION INTRAMUSCULAR; INTRAVENOUS at 08:13

## 2020-11-06 RX ADMIN — IOPAMIDOL 132 ML: 612 INJECTION, SOLUTION INTRAVENOUS at 09:50

## 2020-11-06 RX ADMIN — HEPARIN SODIUM 4000 UNITS: 1000 INJECTION, SOLUTION INTRAVENOUS; SUBCUTANEOUS at 08:43

## 2020-11-06 RX ADMIN — SODIUM CHLORIDE, SODIUM LACTATE, POTASSIUM CHLORIDE, CALCIUM CHLORIDE: 600; 310; 30; 20 INJECTION, SOLUTION INTRAVENOUS at 08:12

## 2020-11-06 RX ADMIN — FENTANYL CITRATE 25 MCG: 50 INJECTION, SOLUTION INTRAMUSCULAR; INTRAVENOUS at 08:33

## 2020-11-06 RX ADMIN — FENTANYL CITRATE 25 MCG: 50 INJECTION, SOLUTION INTRAMUSCULAR; INTRAVENOUS at 09:37

## 2020-11-06 NOTE — OP NOTES
NEUROINTERVENTIONAL SURGERY POST-PROCEDURE NOTE    PROCEDURE:  Cerebral angiogram  Intracranial venogram with manometry/pressure measurements  US guided right femoral venous access  US guided left femoral arterial access  Angioseal:  Left CFA    VESSEL(S) STUDIED:  1. RCCA  2. TERRELL  3. RECA  4. RVA  5. LVA  6. LCCA  7. L jugular bulb pressure  8. L sigmoid venous sinus pressure  9. Left transverse venous sinus pressure  10. Torcular pressure  11. Right transverse venous sinus pressure  12. Right sigmoid sinus pressure  13. Right jugular bulb pressure  14. SVC pressure  15. IVC pressure VESSEL(S) TREATED:  1. none      PRELIMINARY REPORT & DISPOSITION:     No AV fistula. Normal arteriogram.    >70% stenosis of dominant right trv/sigmoid venous sinus junction with 10 mm Hg gradient. > 70% stensosi of nondominant left trv/sigmoid venous sinus. Gradient 11 mm Hg. Normal internal jugular veins bilaterally with good antegrade flow centrally. COMPLICATIONS:    None immediate    FOLLOW-UP:  Outpatient followup to discuss results. DATE OF SERVICE:  11/6/2020 10:08 AM     ATTENDING SURGEON(S):  Waylon Rasmussen MD      ANESTHESIA:   MAC    MEDICATIONS:   See nursing record  4000U heparin IV  132 cc Isovue 300    PUNCTURE SITE:  Right common femoral vein. Left common femoral artery. Arteriotomy closed with 6F Angioseal.  Both legs straight x 2 hours then home.

## 2020-11-06 NOTE — PROGRESS NOTES
Pt tolerated procedure well, right groin site is CDI, with no s/s bleeding or hematoma. Pt remains neuro intact with no deficits. Pt and wife verbalized understanding of d/c and follow up instructions.

## 2020-11-06 NOTE — ANESTHESIA PREPROCEDURE EVALUATION
Relevant Problems   No relevant active problems       Anesthetic History   No history of anesthetic complications            Review of Systems / Medical History  Patient summary reviewed, nursing notes reviewed and pertinent labs reviewed    Pulmonary  Within defined limits                 Neuro/Psych         Headaches    Comments: bilat venous sinus stenosis Cardiovascular  Within defined limits                Exercise tolerance: >4 METS     GI/Hepatic/Renal  Within defined limits              Endo/Other  Within defined limits    Hypothyroidism: well controlled       Other Findings   Comments: Anxiety disorder          Headache      H/O migraines      heart murmur,    N&V (nausea and vomiting)     Neuropathy       Polycystic ovary syndrome        SOB (shortness of breath)     Thyroid disease     Vertigo            Physical Exam    Airway  Mallampati: II  TM Distance: > 6 cm  Neck ROM: normal range of motion   Mouth opening: Normal     Cardiovascular  Regular rate and rhythm,  S1 and S2 normal,  no murmur, click, rub, or gallop             Dental  No notable dental hx       Pulmonary  Breath sounds clear to auscultation               Abdominal  GI exam deferred       Other Findings            Anesthetic Plan    ASA: 3  Anesthesia type: MAC          Induction: Intravenous  Anesthetic plan and risks discussed with: Patient

## 2020-11-06 NOTE — DISCHARGE INSTRUCTIONS
Patient Education        Brain Angiogram: What to Expect at Home  Your Recovery  A brain angiogram (cerebral angiogram) is a test (also called a procedure) that looks for problems with blood vessels and blood flow in the brain. The doctor inserted a thin, flexible tube (catheter) into a blood vessel in your groin. Or the doctor may have put the catheter in a blood vessel in your arm. Then he or she injected a dye into the catheter. The dye flows into the blood vessel. The dye made the blood vessels show up on a video screen. You may have had this test to see if a blood vessel in the brain is bulging, narrowed, or blocked. The test may also be used to check other symptoms, such as unusual headaches, or to check problems found during a different test.  You may have a bruise where the catheter was put in, and you may feel sore for a day or two. You can do light activities around the house. But don't do anything strenuous for several days. This care sheet gives you a general idea about how long it will take for you to recover. But each person recovers at a different pace. Follow the steps below to feel better as quickly as possible. How can you care for yourself at home? Activity    · Do not do strenuous exercise and do not lift, pull, or push anything heavy until your doctor says it is okay. This may be for a day or two. You can walk around the house and do light activity, such as cooking.     · If the catheter was placed in your groin, try not to walk up stairs for the first couple of days.     · If the catheter was placed in your arm near your wrist, do not bend your wrist deeply for the first couple of days. Be careful using your hand to get into and out of a chair or bed.     · If your doctor recommends it, get more exercise. Walking is a good choice. Bit by bit, increase the amount you walk every day. Try for at least 30 minutes on most days of the week.    Diet    · Drink plenty of fluids to help your body flush out the dye. If you have kidney, heart, or liver disease and have to limit fluids, talk with your doctor before you increase the amount of fluids you drink.     · Keep eating a heart-healthy diet that has lots of fruits, vegetables, and whole grains. If you need help with your diet, talk to your doctor. You also may want to talk to a dietitian. This expert can help you to learn about healthy foods and plan meals. Medicines    · Your doctor will tell you if and when you can restart your medicines. He or she will also give you instructions about taking any new medicines.     · If you take aspirin or some other blood thinner, ask your doctor if and when to start taking it again. Make sure that you understand exactly what your doctor wants you to do.     · Be safe with medicines. Read and follow all instructions on the label. ? If the doctor gave you a prescription medicine for pain, take it as prescribed. ? If you are not taking a prescription pain medicine, ask your doctor if you can take an over-the-counter medicine. Care of the catheter site    · For the first 3 days, keep a bandage over the spot where the catheter was put in.     · Put ice or a cold pack on the area for 10 to 20 minutes at a time. Try to do this every 1 to 2 hours for the next 3 days (when you are awake) or until the swelling goes down. Put a thin cloth between the ice and your skin.     · You may shower 24 to 48 hours after the procedure, if your doctor okays it. Pat the incision dry.     · Do not soak the catheter site until it is healed. Don't take a bath for 1 week, or until your doctor tells you it is okay.     · Watch for bleeding from the site. A small amount of blood (up to the size of a quarter) on the bandage can be normal.     · If you are bleeding, lie down and press on the area for 15 minutes to try to make it stop. If the bleeding does not stop, call your doctor or seek immediate medical care.    Follow-up care is a key part of your treatment and safety. Be sure to make and go to all appointments, and call your doctor if you are having problems. It's also a good idea to know your test results and keep a list of the medicines you take. When should you call for help? Call 911 anytime you think you may need emergency care. For example, call if:    · You passed out (lost consciousness).     · You have severe trouble breathing.     · You have sudden chest pain and shortness of breath, or you cough up blood.     · You have symptoms of a stroke. These may include:  ? Sudden numbness, tingling, weakness, or loss of movement in your face, arm, or leg, especially on only one side of your body. ? Sudden vision changes. ? Sudden trouble speaking. ? Sudden confusion or trouble understanding simple statements. ? Sudden problems with walking or balance. ? A sudden, severe headache that is different from past headaches. Call your doctor now or seek immediate medical care if:    · You are bleeding from the area where the catheter was put in your artery.     · You have a fast-growing, painful lump at the catheter site.     · You have symptoms of infection, such as:  ? Increased pain, swelling, warmth, or redness. ? Red streaks leading from the area. ? Pus draining from the area. ? A fever.     · Your leg, arm, or hand is painful, looks blue, or feels cold, numb, or tingly. Watch closely for any changes in your health, and be sure to contact your doctor if:    · You are not getting better as expected. Where can you learn more? Go to http://gustavo-chema.info/  Enter O249 in the search box to learn more about \"Brain Angiogram: What to Expect at Home. \"  Current as of: March 4, 2020               Content Version: 12.6  © 6811-3735 [employer]. Care instructions adapted under license by [employer] (which disclaims liability or warranty for this information).  If you have questions about a medical condition or this instruction, always ask your healthcare professional. [place] [address] any warranty or liability for your use of this information.

## 2020-11-06 NOTE — INTERVAL H&P NOTE
Update History & Physical    The Patient's History and Physical of October 7, 2020,  was reviewed with the patient and I examined the patient. There was no change. The surgical site was confirmed by the patient and me. Plan:  The risk, benefits, expected outcome, and alternative to the recommended procedure have been discussed with the patient. Patient understands and wants to proceed with the procedure.     Electronically signed by Lesley Esteban NP on 11/6/2020 at 7:39 AM

## 2020-11-06 NOTE — PROGRESS NOTES
Pt d/c without incident. Seen by Dr Hernandez Garsia before d/c.  Pt verbalized understand inf d/c and follow up instructions.   W/c to exit, wife driving home

## 2020-11-06 NOTE — ANESTHESIA POSTPROCEDURE EVALUATION
Post-Anesthesia Evaluation and Assessment    Patient: Morrell Boast MRN: [id number]  SSN: [id number]    YOB: 1985  Age: 38 y.o. Sex: female      I have evaluated the patient and they are stable and ready for discharge from the PACU. Cardiovascular Function/Vital Signs  Visit Vitals  BP (!) 108/55   Pulse (!) 56   Temp 36.6 °C (97.9 °F)   Resp 16   Ht 5' 3\" (1.6 m)   Wt 75.8 kg (167 lb)   SpO2 100%   BMI 29.58 kg/m²       Patient is status post * No anesthesia type entered * anesthesia for * No procedures listed *. Nausea/Vomiting: None    Postoperative hydration reviewed and adequate. Pain:  Pain Intensity 1: 0 (11/06/20 1045)   Managed    Neurological Status: At baseline    Mental Status, Level of Consciousness: Alert and  oriented to person, place, and time    Pulmonary Status:   O2 Device: CO2 nasal cannula (11/06/20 0959)   Adequate oxygenation and airway patent    Complications related to anesthesia: None    Post-anesthesia assessment completed. No concerns    Signed By: Nayana Mcgregor MD     November 6, 2020              * No procedures listed *.     MAC    <BSHSIANPOST>    INITIAL Post-op Vital signs:   Vitals Value Taken Time   /55 11/6/2020 10:45 AM   Temp     Pulse 56 11/6/2020 10:45 AM   Resp 16 11/6/2020 10:45 AM   SpO2 100 % 11/6/2020 10:45 AM

## 2020-11-06 NOTE — PROGRESS NOTES
Pt ambulated to unit unassisted, neuro exam by NP and is grossly intact without deficits.   Here for diagnostic cerebral angiogram under anesthesia care, MAC

## 2020-11-16 ENCOUNTER — OFFICE VISIT (OUTPATIENT)
Dept: NEUROSURGERY | Age: 35
End: 2020-11-16
Payer: COMMERCIAL

## 2020-11-16 VITALS
SYSTOLIC BLOOD PRESSURE: 122 MMHG | HEART RATE: 82 BPM | OXYGEN SATURATION: 98 % | WEIGHT: 169.5 LBS | HEIGHT: 63 IN | BODY MASS INDEX: 30.03 KG/M2 | TEMPERATURE: 98.6 F | DIASTOLIC BLOOD PRESSURE: 88 MMHG

## 2020-11-16 DIAGNOSIS — G43.909 MIGRAINE WITHOUT STATUS MIGRAINOSUS, NOT INTRACTABLE, UNSPECIFIED MIGRAINE TYPE: ICD-10-CM

## 2020-11-16 DIAGNOSIS — H93.A1 PULSATILE TINNITUS OF RIGHT EAR: ICD-10-CM

## 2020-11-16 DIAGNOSIS — G89.29 CHRONIC RIGHT-SIDED HEADACHE: ICD-10-CM

## 2020-11-16 DIAGNOSIS — R51.9 CHRONIC RIGHT-SIDED HEADACHE: ICD-10-CM

## 2020-11-16 DIAGNOSIS — H93.A9 PULSATILE TINNITUS: Primary | ICD-10-CM

## 2020-11-16 DIAGNOSIS — G93.2 IDIOPATHIC INTRACRANIAL HYPERTENSION: ICD-10-CM

## 2020-11-16 PROCEDURE — 99215 OFFICE O/P EST HI 40 MIN: CPT | Performed by: RADIOLOGY

## 2020-11-16 RX ORDER — LEVOTHYROXINE SODIUM 112 UG/1
112 TABLET ORAL DAILY
COMMUNITY
Start: 2020-10-28

## 2020-11-16 NOTE — PROGRESS NOTES
Procedure follow up for Pulsatile Tinnitus. Patient reports no change in headaches, dizziness, blurred vision and nausea. No acute problems reported from procedure. Denies bruising, drainage numbness or pain at left and right groin puncture sites.

## 2020-11-16 NOTE — PATIENT INSTRUCTIONS
Your recent cerebral angiogram demonstrates narrowing of the both transverse/sigmoid cerebral venous sinuses. The maximum cerebral venous sinus gradient is 10 mmHg. Based on our conversations with Dr. Jarrod Johnson, your neuro-ophthalmologist, decisions to stent the dominant right transverse sinus to correct the narrowing are not necessarily indicated for the purpose of preserving vision. Dr. Jarrod Johnson is confident that your vision is not at risk. The narrowing is probably causing your right-sided pulsatile tinnitus and stenting may correct that. 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 With respect to your chronic headache syndrome, I am less confident that stenting would alleviate this symptom. For this reason, I am asking for a second opinion from Dr. Khari Lerma at the [address] [place] [address] in [facility]. You have been referred to Dr. Cici Chanel for an opinion about stenting specifically to correct the chronic headache syndrome and to a lesser extent your right-sided pulsatile tinnitus. At this point I do not consider any of your symptoms or findings dangerous. Continue weight loss and altering your habit of sleeping on your stomach are essential along with strict compliance with acetazolamide. Please make an appointment with Dr. Goodrich Both after you have seen Dr. Cici Chanel in [facility] for another discussion and continue your efforts at weight loss. A Healthy Lifestyle: Care Instructions Your Care Instructions A healthy lifestyle can help you feel good, stay at a healthy weight, and have plenty of energy for both work and play. A healthy lifestyle is something you can share with your whole family. A healthy lifestyle also can lower your risk for serious health problems, such as high blood pressure, heart disease, and diabetes. You can follow a few steps listed below to improve your health and the health of your family. Follow-up care is a key part of your treatment and safety. Be sure to make and go to all appointments, and call your doctor if you are having problems. It's also a good idea to know your test results and keep a list of the medicines you take. How can you care for yourself at home? · Do not eat too much sugar, fat, or fast foods. You can still have dessert and treats now and then. The goal is moderation. · Start small to improve your eating habits. Pay attention to portion sizes, drink less juice and soda pop, and eat more fruits and vegetables. ? Eat a healthy amount of food. A 3-ounce serving of meat, for example, is about the size of a deck of cards. Fill the rest of your plate with vegetables and whole grains. ? Limit the amount of soda and sports drinks you have every day. Drink more water when you are thirsty. ? Eat at least 5 servings of fruits and vegetables every day. It may seem like a lot, but it is not hard to reach this goal. A serving or helping is 1 piece of fruit, 1 cup of vegetables, or 2 cups of leafy, raw vegetables. Have an apple or some carrot sticks as an afternoon snack instead of a candy bar. Try to have fruits and/or vegetables at every meal. 
· Make exercise part of your daily routine. You may want to start with simple activities, such as walking, bicycling, or slow swimming. Try to be active 30 to 60 minutes every day. You do not need to do all 30 to 60 minutes all at once. For example, you can exercise 3 times a day for 10 or 20 minutes. Moderate exercise is safe for most people, but it is always a good idea to talk to your doctor before starting an exercise program. 
· Keep moving. [place] Gather the lawn, work in the garden, or [employer] Me. Take the stairs instead of the elevator at work. · If you smoke, quit.  People who smoke have an increased risk for heart attack, stroke, cancer, and other lung illnesses. Quitting is hard, but there are ways to boost your chance of quitting tobacco for good. ? Use nicotine gum, patches, or lozenges. ? Ask your doctor about stop-smoking programs and medicines. ? Keep trying. In addition to reducing your risk of diseases in the future, you will notice some benefits soon after you stop using tobacco. If you have shortness of breath or asthma symptoms, they will likely get better within a few weeks after you quit. · Limit how much alcohol you drink. Moderate amounts of alcohol (up to 2 drinks a day for men, 1 drink a day for women) are okay. But drinking too much can lead to liver problems, high blood pressure, and other health problems. Family health If you have a family, there are many things you can do together to improve your health. · Eat meals together as a family as often as possible. · Eat healthy foods. This includes fruits, vegetables, lean meats and dairy, and whole grains. · Include your family in your fitness plan. Most people think of activities such as jogging or tennis as the way to fitness, but there are many ways you and your family can be more active. Anything that makes you breathe hard and gets your heart pumping is exercise. Here are some tips: 
? Walk to do errands or to take your child to school or the bus. 
? Go for a family bike ride after dinner instead of watching TV. Where can you learn more? Go to http://www.gray.com/ Enter R782 in the search box to learn more about \"A Healthy Lifestyle: Care Instructions. \" Current as of: January 31, 2020               Content Version: 12.6 © 4659-6140 [employer]. Care instructions adapted under license by [employer] (which disclaims liability or warranty for this information).  If you have questions about a medical condition or this instruction, always ask your healthcare professional. [address] any warranty or liability for your use of this information.

## 2020-11-17 NOTE — PROGRESS NOTES
Neurointerventional Surgery  Ambulatory Progress Note      Patient: Fatou Oh MRN: [id number]  SSN: [id number]    YOB: 1985  Age: 38 y.o. Sex: female      History of Present Illness:      Fatou Oh presents today for clinical follow-up and review of her recent cerebral angiogram and cerebral venous manometry findings (11/6/2020). Since the procedure, the patient reports no new symptoms. She continues to experience predominantly right-sided headaches on a daily basis. Intermittent dizziness, blurred vision and nausea are also unchanged. \"I just do not feel good\" \"I get dizzy when I bend over\". Pulsatile tinnitus is unchanged with worsening at night and in the morning. Pressure on the right neck soft tissues alleviates the unwanted noise completely. Her groin sites are nontender. She does not endorse any focal or transient neurological changes. Patient Active Problem List   Diagnosis Code    Normal labor O80, Z37.9    Pulsatile tinnitus of right ear H93. A1    Obesity (BMI 30.0-34. 9) E66.9    Chronic right-sided headache R51.9, G89.29    Migraine G43.909    Idiopathic intracranial hypertension G93.2        Review of Systems    A comprehensive ROS was performed and was negative except for as per HPI. Objective:     Current Outpatient Medications   Medication Sig Dispense Refill    levothyroxine (SYNTHROID) 112 mcg tablet Take 112 mcg by mouth daily.  multivitamin (ONE A DAY) tablet Take 1 Tab by mouth daily.  fremanezumab-vfrm (Ajovy Autoinjector) 225 mg/1.5 mL atIn 1 Syringe by SubCUTAneous route every thirty (30) days. 1 Syringe 5    acetaZOLAMIDE (DIAMOX) 250 mg tablet Take 3 Tabs by mouth two (2) times a day. 180 Tab 3    aspirin delayed-release 81 mg tablet Take 2 Tabs by mouth daily.  Indications: prevention for a blood clot going to the brain, rheumatoid arthritis, venous sinus stenosis 60 Tab 5    buPROPion XL (WELLBUTRIN XL) 300 mg XL tablet Take 300 mg by mouth daily.  meclizine (ANTIVERT) 25 mg tablet meclizine 25 mg tablet   Take 1 tablet 3 times a day by oral route as needed.  ondansetron (ZOFRAN ODT) 4 mg disintegrating tablet DISSOLVE 1 TABLET BY MOUTH TWICE A DAY AS NEEDED      ALPRAZolam (XANAX) 0.5 mg tablet Take 0.5 mg by mouth nightly.  cetirizine (ZyrTEC) 10 mg tablet Take 10 mg by mouth daily.  simvastatin (ZOCOR) 20 mg tablet simvastatin 20 mg tablet      butalbital-acetaminophen-caffeine (FIORICET, ESGIC) -40 mg per tablet TAKE 1 TABLET BY MOUTH EVERY 4 HOURS AS NEEDED FOR HEADACHE      levothyroxine (SYNTHROID) 125 mcg tablet Take 0.125 mcg by mouth daily.  INTRAUTERINE DEVICE, IUD, IU by IntraUTERine route. Visit Vitals  /88 (BP 1 Location: Left arm)   Pulse 82   Temp 98.6 °F (37 °C) (Temporal)   Ht 5' 3\" (1.6 m)   Wt 169 lb 8 oz (76.9 kg)   SpO2 98%   BMI 30.03 kg/m²       Allergies   Allergen Reactions    Codeine Hives    Percocet [Oxycodone-Acetaminophen] Hives    Codeine Nausea and Vomiting    Percocet [Oxycodone-Acetaminophen] Nausea and Vomiting       Current Outpatient Medications   Medication Sig    levothyroxine (SYNTHROID) 112 mcg tablet Take 112 mcg by mouth daily.  multivitamin (ONE A DAY) tablet Take 1 Tab by mouth daily.  fremanezumab-vfrm (Ajovy Autoinjector) 225 mg/1.5 mL atIn 1 Syringe by SubCUTAneous route every thirty (30) days.  acetaZOLAMIDE (DIAMOX) 250 mg tablet Take 3 Tabs by mouth two (2) times a day.  aspirin delayed-release 81 mg tablet Take 2 Tabs by mouth daily. Indications: prevention for a blood clot going to the brain, rheumatoid arthritis, venous sinus stenosis    buPROPion XL (WELLBUTRIN XL) 300 mg XL tablet Take 300 mg by mouth daily.  meclizine (ANTIVERT) 25 mg tablet meclizine 25 mg tablet   Take 1 tablet 3 times a day by oral route as needed.     ondansetron (ZOFRAN ODT) 4 mg disintegrating tablet DISSOLVE 1 TABLET BY MOUTH TWICE A DAY AS NEEDED    ALPRAZolam (XANAX) 0.5 mg tablet Take 0.5 mg by mouth nightly.  cetirizine (ZyrTEC) 10 mg tablet Take 10 mg by mouth daily.  simvastatin (ZOCOR) 20 mg tablet simvastatin 20 mg tablet    butalbital-acetaminophen-caffeine (FIORICET, ESGIC) -40 mg per tablet TAKE 1 TABLET BY MOUTH EVERY 4 HOURS AS NEEDED FOR HEADACHE    levothyroxine (SYNTHROID) 125 mcg tablet Take 0.125 mcg by mouth daily.  INTRAUTERINE DEVICE, IUD, IU by IntraUTERine route. No current facility-administered medications for this visit. Physical Exam:  General: NAD  Extremities: Groin sites are soft, nontender. There is no swelling, erythema or drainage. Extremities normal, atraumatic, no cyanosis or edema  Pulses: 2+ and symmetric    Neurologic Exam:  Mental Status:  Alert and oriented x 4. Appropriate affect, mood and behavior. Language:    Normal fluency, repetition, comprehension and naming. Cranial Nerves:   Pupils equal, round and reactive to light. Visual fields full to confrontation. No visual extinction. Extraocular movements intact. Facial sensation intact V1 - V3. Full facial strength, no asymmetry. Hearing intact bilaterally. No dysarthria. Tongue protrudes to midline, palate elevates symmetrically. Shoulder shrug 5/5 bilaterally. Motor:    No pronator drift. Bulk and tone normal.      5/5 power in all extremities proximally and distally. No involuntary movements. Sensation:    Sensation intact throughout to light touch. No neglect. Romberg is negative. Coordination & Gait: Normal, tandem gait normal, FTN and HTS intact.         Labs:  Lab Results   Component Value Date/Time    Sodium 137 01/25/2017 08:42 AM    Potassium 4.2 01/25/2017 08:42 AM    Chloride 103 01/25/2017 08:42 AM    CO2 25 01/25/2017 08:42 AM    Anion gap 9 01/25/2017 08:42 AM    Glucose 115 (H) 01/25/2017 08:42 AM    BUN 13 01/25/2017 08:42 AM    Creatinine 0.82 01/25/2017 08:42 AM    BUN/Creatinine ratio 16 01/25/2017 08:42 AM    GFR est AA >60 01/25/2017 08:42 AM    GFR est non-AA >60 01/25/2017 08:42 AM    Calcium 8.6 01/25/2017 08:42 AM     Lab Results   Component Value Date/Time    WBC 7.0 11/02/2020 02:07 PM    HGB 13.1 11/02/2020 02:07 PM    HCT 40.1 11/02/2020 02:07 PM    PLATELET 555 47/45/4470 02:07 PM    MCV 88 11/02/2020 02:07 PM     Lab Results   Component Value Date/Time    MCH 28.8 11/02/2020 02:07 PM    MCHC 32.7 11/02/2020 02:07 PM    BASOPHILS 0 01/25/2017 08:42 AM    ABS. LYMPHOCYTES 1.5 01/25/2017 08:42 AM    ABS. MONOCYTES 0.3 01/25/2017 08:42 AM    ABS. EOSINOPHILS 0.0 01/25/2017 08:42 AM    ABS. BASOPHILS 0.0 01/25/2017 08:42 AM       IMAGING:    Images of the 11/6/2020 cerebral angiogram and cerebral venous angiography with manometry measurements were reviewed in detail with the patient today. These demonstrate a dominant right transverse sigmoid venous system with > 70% stenosis of the right transverse sigmoid junction. The nondominant left transverse sigmoid system also exhibits stenosis at the same location. The central intracranial venous gradient is 9-10 mmHg across the stenosis. The following intracranial venous pressures were obtained:     Left jugular bulb: 11 mmHg  Left sigmoid sinus: 11 mmHg  Left distal transverse sinus: 20 mmHg  Left proximal transverse sinus: 21 mmHg  Torcular: 20-21 mmHg  Right transverse sinus: 21 mmHg  Right proximal sigmoid sinus: 18 mmHg  Right distal sigmoid sinus: 11 mmHg  Right jugular bulb: 11 mmHg  Right internal jugular vein: 11 mmHg  Superior vena cava: 10-11 mmHg  Right atrium: 10-11 mmHg  Inferior vena cava: 10-11 mmHg     Maximum intracranial venous gradient: 10 mmHg    An MRI report was provided from [facility] dated 6/15/2020 by Dr. Francisco Davis. Only the report is available.   There are no images available for direct review.       The dictated impression is as follows:     \"Mild flattening of the pituitary gland to the sellar floor. Prominent bilateral optic nerve sheaths. Suggested symmetric narrowing of the distal transverse sinuses. Constellation of findings are nonspecific, however in a patient this age may represent sequela of idiopathic intracranial hypertension. \"    Assessment/Plan:       ICD-10-CM ICD-9-CM    1. Pulsatile tinnitus  H93. A9 388.30 REFERRAL TO NEUROINTERVENTIONAL SURGERY   2. Migraine without status migrainosus, not intractable, unspecified migraine type  G43.909 346.90    3. Idiopathic intracranial hypertension  G93.2 348.2    4. Chronic right-sided headache  R51.9 784.0     G89.29     5. Pulsatile tinnitus of right ear  H93. A1 388.30      Mild pseudotumor cerebri well controlled with respect to optic disc edema on 750 mg of Diamox p.o. twice daily and ongoing intentional weight loss. I was able to speak to Dr. Geoffrey Adkins (neuro-ophthalmology at [facility]). He indicated that her optic nerves are anatomically small but there is no evidence of nerve damage/neuropathy per se. There was no papilledema but he did mention a lack of spontaneous venous pulsation which can be indicative of mild intracranial hypertension. The most recent lumbar puncture demonstrated an opening pressure of 24.9 cm CSF. From an ophthalmologic standpoint, she is not at significant risk of vision loss at this point but she does have an intracranial cerebral venous sinus gradient (10 mmHg) across bilateral transverse/sigmoid stenoses. Cerebral venous sinus gradients greater than 8 mmHg can be clinically significant. Ms. Halley Gerber continues to experience significant symptoms of daily headaches, intermittent dizziness, nausea, blurred vision and right-sided pulsatile tinnitus. Her main complaint is that she \"does not feel good\" and misses work frequently for headache and nausea.   \"I am unable to spend quality time of my children because of the symptoms\". At this point, the clinical question is whether she would benefit from elective stenting of the dominant right transverse sigmoid sinus stenosis. We discussed the fact that stenting may not improve her headaches and could worsen them. Stenting the stenosis may alleviate her right-sided pulsatile tinnitus but it is difficult to say whether the constitutional symptoms such as dizziness and nausea would respond. We also discussed the presence of a high riding right jugular bulb which can also cause pulsatile tinnitus. She initially experienced perioral sensory changes on Diamox but this has resolved. The chronic symptoms she is reporting today preceded the initiation of Diamox. Today we discussed conservative measures including continued weight loss which is usually effective at controlling pseudotumor symptoms. Her current BMI is 30. She does not feel that her intentional weight loss has improved her symptoms \"at all\". At this point, she is expressing a strong desire to proceed with endovascular stenting and believes that it is the only therapy that is going to help her symptoms. We discussed the risks of intracranial stenting today in detail and the patient still expressed a strong desire to move ahead. \"I am miserable\". I am still open to the possibility of placing an intracranial stent in this patient. However, I do not think that endovascular treatment is urgent based on the available objective clinical data. Before proceeding with intracranial stenting, I would like to obtain a second opinion from my partners at the [facility] Kandi Michel in [place]. I have discussed this case with Dr. Marvin Meng and the patient has agreed to see Dr. Nuno Joseph in [place]. Dr. Nuno Joseph has extensive experience in this area and I value his opinion. Follow-up Disposition:    I have discussed the diagnosis with the patient and the intended plan as seen in the above orders.  Patient is in agreement. The patient has received an after-visit summary and questions were answered concerning future plans. I have discussed medication side effects and warnings with the patient as well.     Keyona Stockton MD, PhD    [facility]

## 2020-12-17 DIAGNOSIS — I67.9 INTRACRANIAL VASCULAR STENOSIS: ICD-10-CM

## 2020-12-17 DIAGNOSIS — H93.A1 PULSATILE TINNITUS OF RIGHT EAR: ICD-10-CM

## 2020-12-17 DIAGNOSIS — G93.2 IDIOPATHIC INTRACRANIAL HYPERTENSION: ICD-10-CM

## 2020-12-21 RX ORDER — ACETAZOLAMIDE 250 MG/1
750 TABLET ORAL 2 TIMES DAILY
Qty: 540 TAB | Refills: 1 | Status: SHIPPED | OUTPATIENT
Start: 2020-12-21 | End: 2021-06-28

## 2021-06-28 DIAGNOSIS — G93.2 IDIOPATHIC INTRACRANIAL HYPERTENSION: ICD-10-CM

## 2021-06-28 DIAGNOSIS — H93.A1 PULSATILE TINNITUS OF RIGHT EAR: ICD-10-CM

## 2021-06-28 DIAGNOSIS — I67.9 INTRACRANIAL VASCULAR STENOSIS: ICD-10-CM

## 2021-06-28 RX ORDER — ACETAZOLAMIDE 250 MG/1
750 TABLET ORAL 2 TIMES DAILY
Qty: 540 TABLET | Refills: 1 | Status: SHIPPED | OUTPATIENT
Start: 2021-06-28 | End: 2021-12-29

## 2021-08-11 ENCOUNTER — TELEPHONE (OUTPATIENT)
Dept: NEUROLOGY | Age: 36
End: 2021-08-11

## 2021-08-11 NOTE — TELEPHONE ENCOUNTER
Re: Guillermo de oliveira fax from [facility] saying PA needed, submitted in [facility]    [place] [facility], awaiting update

## 2021-08-16 NOTE — TELEPHONE ENCOUNTER
rcyonatan PA denial dated 08/13/21. Per letter we did not provide proof that pt has received a reduction is migraine frequency, pt has not been seen since 09/22/2020 and next appt is not until 10/04/2021, scanned denial to chart and sent message to nurse to see if they can see pt sooner.

## 2021-10-04 ENCOUNTER — OFFICE VISIT (OUTPATIENT)
Dept: NEUROLOGY | Age: 36
End: 2021-10-04
Payer: COMMERCIAL

## 2021-10-04 VITALS
BODY MASS INDEX: 30.72 KG/M2 | OXYGEN SATURATION: 99 % | SYSTOLIC BLOOD PRESSURE: 138 MMHG | RESPIRATION RATE: 20 BRPM | HEART RATE: 111 BPM | DIASTOLIC BLOOD PRESSURE: 82 MMHG | HEIGHT: 63 IN | WEIGHT: 173.4 LBS

## 2021-10-04 DIAGNOSIS — G93.2 IDIOPATHIC INTRACRANIAL HYPERTENSION: ICD-10-CM

## 2021-10-04 DIAGNOSIS — I67.9 INTRACRANIAL VASCULAR STENOSIS: ICD-10-CM

## 2021-10-04 DIAGNOSIS — G43.709 CHRONIC MIGRAINE W/O AURA W/O STATUS MIGRAINOSUS, NOT INTRACTABLE: Primary | ICD-10-CM

## 2021-10-04 PROCEDURE — 99215 OFFICE O/P EST HI 40 MIN: CPT | Performed by: PSYCHIATRY & NEUROLOGY

## 2021-10-04 RX ORDER — ERENUMAB-AOOE 70 MG/ML
70 INJECTION SUBCUTANEOUS
Qty: 1 EACH | Refills: 11 | Status: SHIPPED | OUTPATIENT
Start: 2021-10-04

## 2021-10-04 RX ORDER — KETOROLAC TROMETHAMINE 10 MG/1
TABLET, FILM COATED ORAL
COMMUNITY
Start: 2021-08-02 | End: 2021-10-04

## 2021-10-04 RX ORDER — NORTRIPTYLINE HYDROCHLORIDE 10 MG/1
20 CAPSULE ORAL
Qty: 180 CAPSULE | Refills: 1 | Status: SHIPPED | OUTPATIENT
Start: 2021-10-04

## 2021-10-04 RX ORDER — NORTRIPTYLINE HYDROCHLORIDE 10 MG/1
CAPSULE ORAL
COMMUNITY
Start: 2021-08-21 | End: 2021-10-04 | Stop reason: SDUPTHER

## 2021-10-04 NOTE — PROGRESS NOTES
Ms. Jefferson Bland presents today to follow up [address] [facility]. She reported an improvement in her headaches and now has a dull headache approximately 75% of the time. Depression screening done on this patient.

## 2021-10-04 NOTE — PATIENT INSTRUCTIONS
[facility]   Statement to Patients  April 1, 2014      In an effort to ensure the large volume of patient prescription refills is processed in the most efficient and expeditious manner, we are asking our patients to assist us by calling your Pharmacy for all prescription refills, this will include also your  Mail Order Pharmacy. The pharmacy will contact our office electronically to continue the refill process. Please do not wait until the last minute to call your pharmacy. We need at least 48 hours (2days) to fill prescriptions. We also encourage you to call your pharmacy before going to  your prescription to make sure it is ready. With regard to controlled substance prescription refill requests (narcotic refills) that need to be picked up at our office, we ask your cooperation by providing us with at least 72 hours (3days) notice that you will need a refill. We will not refill narcotic prescription refill requests after 4:00pm on any weekday, Monday through Thursday, or after 2:00pm on Fridays, or on the weekends. We encourage everyone to explore another way of getting your prescription refill request processed using [employer], our patient web portal through our electronic medical record system. [employer] is an efficient and effective way to communicate your medication request directly to the office and  downloadable as an bryant on your smart phone . [employer] also features a review functionality that allows you to view your medication list as well as leave messages for your physician. Are you ready to get connected? If so please review the attatched instructions or speak to any of our staff to get you set up right away! Thank you so much for your cooperation. Should you have any questions please contact our Practice Administrator.     The Physicians and Staff,  [facility]

## 2021-10-14 ENCOUNTER — TELEPHONE (OUTPATIENT)
Dept: NEUROLOGY | Age: 36
End: 2021-10-14

## 2021-10-14 NOTE — TELEPHONE ENCOUNTER
Re: Robson ANDERSEN approval effective 10/14/21-01/12/22    Called pharmacy to advise.  Sent message to nurse to schedule f/u prior to expiration to see how medication is working

## 2022-01-25 ENCOUNTER — TELEPHONE (OUTPATIENT)
Dept: NEUROLOGY | Age: 37
End: 2022-01-25

## 2022-01-25 NOTE — TELEPHONE ENCOUNTER
Re: Bteh Allen  PA request through [facility] [place]# [id number]    Submitted and vd PA approval 22-23